# Patient Record
Sex: FEMALE | Race: WHITE | NOT HISPANIC OR LATINO | Employment: FULL TIME | URBAN - METROPOLITAN AREA
[De-identification: names, ages, dates, MRNs, and addresses within clinical notes are randomized per-mention and may not be internally consistent; named-entity substitution may affect disease eponyms.]

---

## 2020-06-18 ENCOUNTER — TELEPHONE (OUTPATIENT)
Dept: OBGYN CLINIC | Facility: CLINIC | Age: 55
End: 2020-06-18

## 2020-06-18 DIAGNOSIS — B37.3 CANDIDA VAGINITIS: Primary | ICD-10-CM

## 2020-06-18 RX ORDER — FLUCONAZOLE 150 MG/1
150 TABLET ORAL ONCE
COMMUNITY
End: 2021-08-24

## 2020-06-18 RX ORDER — FLUCONAZOLE 150 MG/1
150 TABLET ORAL
Qty: 2 TABLET | Refills: 0 | Status: SHIPPED | OUTPATIENT
Start: 2020-06-18 | End: 2020-06-22

## 2021-08-24 ENCOUNTER — HOSPITAL ENCOUNTER (INPATIENT)
Facility: HOSPITAL | Age: 56
LOS: 1 days | Discharge: HOME | DRG: 178 | End: 2021-08-25
Attending: EMERGENCY MEDICINE | Admitting: INTERNAL MEDICINE
Payer: COMMERCIAL

## 2021-08-24 ENCOUNTER — APPOINTMENT (EMERGENCY)
Dept: RADIOLOGY | Facility: HOSPITAL | Age: 56
DRG: 178 | End: 2021-08-24
Payer: COMMERCIAL

## 2021-08-24 DIAGNOSIS — E86.9 VOLUME DEPLETION: ICD-10-CM

## 2021-08-24 DIAGNOSIS — R07.9 CHEST PAIN: ICD-10-CM

## 2021-08-24 DIAGNOSIS — R42 DIZZINESS: Primary | ICD-10-CM

## 2021-08-24 DIAGNOSIS — R77.8 ELEVATED TROPONIN: ICD-10-CM

## 2021-08-24 PROBLEM — E78.5 DYSLIPIDEMIA: Status: ACTIVE | Noted: 2021-08-24

## 2021-08-24 PROBLEM — E11.9 TYPE 2 DIABETES MELLITUS (HCC): Status: ACTIVE | Noted: 2021-08-24

## 2021-08-24 PROBLEM — R55 NEAR SYNCOPE: Status: ACTIVE | Noted: 2021-08-24

## 2021-08-24 PROBLEM — Z98.890 H/O CAROTID ENDARTERECTOMY: Status: ACTIVE | Noted: 2021-08-24

## 2021-08-24 PROBLEM — R01.1 UNDIAGNOSED CARDIAC MURMURS: Status: ACTIVE | Noted: 2021-08-24

## 2021-08-24 LAB
ALBUMIN SERPL BCP-MCNC: 3.7 G/DL (ref 3.5–5)
ALP SERPL-CCNC: 92 U/L (ref 46–116)
ALT SERPL W P-5'-P-CCNC: 69 U/L (ref 12–78)
ANION GAP SERPL CALCULATED.3IONS-SCNC: 12 MMOL/L (ref 4–13)
APTT PPP: 29 SECONDS (ref 23–37)
APTT PPP: 60 SECONDS (ref 23–37)
AST SERPL W P-5'-P-CCNC: 37 U/L (ref 5–45)
ATRIAL RATE: 95 BPM
BASOPHILS # BLD AUTO: 0.02 THOUSANDS/ΜL (ref 0–0.1)
BASOPHILS NFR BLD AUTO: 0 % (ref 0–1)
BILIRUB SERPL-MCNC: 0.36 MG/DL (ref 0.2–1)
BUN SERPL-MCNC: 13 MG/DL (ref 5–25)
CALCIUM SERPL-MCNC: 8.9 MG/DL (ref 8.3–10.1)
CHLORIDE SERPL-SCNC: 103 MMOL/L (ref 100–108)
CO2 SERPL-SCNC: 24 MMOL/L (ref 21–32)
CREAT SERPL-MCNC: 1.05 MG/DL (ref 0.6–1.3)
D DIMER PPP FEU-MCNC: 0.65 UG/ML FEU
EOSINOPHIL # BLD AUTO: 0.02 THOUSAND/ΜL (ref 0–0.61)
EOSINOPHIL NFR BLD AUTO: 0 % (ref 0–6)
ERYTHROCYTE [DISTWIDTH] IN BLOOD BY AUTOMATED COUNT: 13.1 % (ref 11.6–15.1)
ERYTHROCYTE [DISTWIDTH] IN BLOOD BY AUTOMATED COUNT: 13.1 % (ref 11.6–15.1)
GFR SERPL CREATININE-BSD FRML MDRD: 60 ML/MIN/1.73SQ M
GLUCOSE SERPL-MCNC: 216 MG/DL (ref 65–140)
GLUCOSE SERPL-MCNC: 223 MG/DL (ref 65–140)
GLUCOSE SERPL-MCNC: 240 MG/DL (ref 65–140)
HCT VFR BLD AUTO: 38.3 % (ref 34.8–46.1)
HCT VFR BLD AUTO: 40.9 % (ref 34.8–46.1)
HGB BLD-MCNC: 13.1 G/DL (ref 11.5–15.4)
HGB BLD-MCNC: 13.7 G/DL (ref 11.5–15.4)
IMM GRANULOCYTES # BLD AUTO: 0.03 THOUSAND/UL (ref 0–0.2)
IMM GRANULOCYTES NFR BLD AUTO: 1 % (ref 0–2)
INR PPP: 0.96 (ref 0.84–1.19)
LYMPHOCYTES # BLD AUTO: 0.98 THOUSANDS/ΜL (ref 0.6–4.47)
LYMPHOCYTES NFR BLD AUTO: 18 % (ref 14–44)
MCH RBC QN AUTO: 31.6 PG (ref 26.8–34.3)
MCH RBC QN AUTO: 31.7 PG (ref 26.8–34.3)
MCHC RBC AUTO-ENTMCNC: 33.5 G/DL (ref 31.4–37.4)
MCHC RBC AUTO-ENTMCNC: 34.2 G/DL (ref 31.4–37.4)
MCV RBC AUTO: 93 FL (ref 82–98)
MCV RBC AUTO: 94 FL (ref 82–98)
MONOCYTES # BLD AUTO: 0.55 THOUSAND/ΜL (ref 0.17–1.22)
MONOCYTES NFR BLD AUTO: 10 % (ref 4–12)
NEUTROPHILS # BLD AUTO: 3.74 THOUSANDS/ΜL (ref 1.85–7.62)
NEUTS SEG NFR BLD AUTO: 71 % (ref 43–75)
NRBC BLD AUTO-RTO: 0 /100 WBCS
P AXIS: 62 DEGREES
PLATELET # BLD AUTO: 207 THOUSANDS/UL (ref 149–390)
PLATELET # BLD AUTO: 239 THOUSANDS/UL (ref 149–390)
PMV BLD AUTO: 10 FL (ref 8.9–12.7)
PMV BLD AUTO: 9.7 FL (ref 8.9–12.7)
POTASSIUM SERPL-SCNC: 4 MMOL/L (ref 3.5–5.3)
PR INTERVAL: 146 MS
PROT SERPL-MCNC: 7 G/DL (ref 6.4–8.2)
PROTHROMBIN TIME: 12.9 SECONDS (ref 11.6–14.5)
QRS AXIS: 13 DEGREES
QRSD INTERVAL: 88 MS
QT INTERVAL: 350 MS
QTC INTERVAL: 439 MS
RBC # BLD AUTO: 4.13 MILLION/UL (ref 3.81–5.12)
RBC # BLD AUTO: 4.34 MILLION/UL (ref 3.81–5.12)
SODIUM SERPL-SCNC: 139 MMOL/L (ref 136–145)
T WAVE AXIS: 50 DEGREES
TROPONIN I SERPL-MCNC: 0.21 NG/ML
TROPONIN I SERPL-MCNC: 0.59 NG/ML
TROPONIN I SERPL-MCNC: 0.7 NG/ML
VENTRICULAR RATE: 95 BPM
WBC # BLD AUTO: 4.06 THOUSAND/UL (ref 4.31–10.16)
WBC # BLD AUTO: 5.34 THOUSAND/UL (ref 4.31–10.16)

## 2021-08-24 PROCEDURE — 99285 EMERGENCY DEPT VISIT HI MDM: CPT | Performed by: EMERGENCY MEDICINE

## 2021-08-24 PROCEDURE — 71046 X-RAY EXAM CHEST 2 VIEWS: CPT

## 2021-08-24 PROCEDURE — 85610 PROTHROMBIN TIME: CPT | Performed by: INTERNAL MEDICINE

## 2021-08-24 PROCEDURE — 85027 COMPLETE CBC AUTOMATED: CPT | Performed by: INTERNAL MEDICINE

## 2021-08-24 PROCEDURE — 96365 THER/PROPH/DIAG IV INF INIT: CPT

## 2021-08-24 PROCEDURE — 85730 THROMBOPLASTIN TIME PARTIAL: CPT | Performed by: INTERNAL MEDICINE

## 2021-08-24 PROCEDURE — 82948 REAGENT STRIP/BLOOD GLUCOSE: CPT

## 2021-08-24 PROCEDURE — 99254 IP/OBS CNSLTJ NEW/EST MOD 60: CPT | Performed by: INTERNAL MEDICINE

## 2021-08-24 PROCEDURE — 93010 ELECTROCARDIOGRAM REPORT: CPT | Performed by: INTERNAL MEDICINE

## 2021-08-24 PROCEDURE — 99285 EMERGENCY DEPT VISIT HI MDM: CPT

## 2021-08-24 PROCEDURE — 99223 1ST HOSP IP/OBS HIGH 75: CPT | Performed by: INTERNAL MEDICINE

## 2021-08-24 PROCEDURE — 80053 COMPREHEN METABOLIC PANEL: CPT | Performed by: EMERGENCY MEDICINE

## 2021-08-24 PROCEDURE — 93005 ELECTROCARDIOGRAM TRACING: CPT

## 2021-08-24 PROCEDURE — 85379 FIBRIN DEGRADATION QUANT: CPT | Performed by: EMERGENCY MEDICINE

## 2021-08-24 PROCEDURE — 85025 COMPLETE CBC W/AUTO DIFF WBC: CPT | Performed by: EMERGENCY MEDICINE

## 2021-08-24 PROCEDURE — 84484 ASSAY OF TROPONIN QUANT: CPT | Performed by: EMERGENCY MEDICINE

## 2021-08-24 PROCEDURE — 84484 ASSAY OF TROPONIN QUANT: CPT | Performed by: INTERNAL MEDICINE

## 2021-08-24 PROCEDURE — 36415 COLL VENOUS BLD VENIPUNCTURE: CPT | Performed by: EMERGENCY MEDICINE

## 2021-08-24 RX ORDER — ASPIRIN 325 MG
325 TABLET ORAL ONCE
Status: COMPLETED | OUTPATIENT
Start: 2021-08-24 | End: 2021-08-24

## 2021-08-24 RX ORDER — FLUTICASONE PROPIONATE 50 MCG
2 SPRAY, SUSPENSION (ML) NASAL DAILY
COMMUNITY

## 2021-08-24 RX ORDER — LORATADINE 10 MG/1
10 TABLET ORAL AS NEEDED
COMMUNITY

## 2021-08-24 RX ORDER — METHYLPREDNISOLONE 16 MG/1
32 TABLET ORAL ONCE
Status: COMPLETED | OUTPATIENT
Start: 2021-08-25 | End: 2021-08-25

## 2021-08-24 RX ORDER — FERROUS SULFATE 325(65) MG
325 TABLET ORAL
COMMUNITY

## 2021-08-24 RX ORDER — EZETIMIBE 10 MG/1
10 TABLET ORAL
Status: DISCONTINUED | OUTPATIENT
Start: 2021-08-24 | End: 2021-08-25 | Stop reason: HOSPADM

## 2021-08-24 RX ORDER — SEMAGLUTIDE 1.34 MG/ML
1 INJECTION, SOLUTION SUBCUTANEOUS WEEKLY
COMMUNITY

## 2021-08-24 RX ORDER — BIOTIN 10 MG
10 TABLET ORAL
COMMUNITY

## 2021-08-24 RX ORDER — HEPARIN SODIUM 1000 [USP'U]/ML
4000 INJECTION, SOLUTION INTRAVENOUS; SUBCUTANEOUS
Status: DISCONTINUED | OUTPATIENT
Start: 2021-08-24 | End: 2021-08-25 | Stop reason: HOSPADM

## 2021-08-24 RX ORDER — FLUTICASONE PROPIONATE 50 MCG
2 SPRAY, SUSPENSION (ML) NASAL DAILY
Status: DISCONTINUED | OUTPATIENT
Start: 2021-08-24 | End: 2021-08-25 | Stop reason: HOSPADM

## 2021-08-24 RX ORDER — OMEPRAZOLE 20 MG/1
20 CAPSULE, DELAYED RELEASE ORAL DAILY
COMMUNITY

## 2021-08-24 RX ORDER — METHYLPREDNISOLONE 16 MG/1
32 TABLET ORAL ONCE
Status: DISCONTINUED | OUTPATIENT
Start: 2021-08-24 | End: 2021-08-24

## 2021-08-24 RX ORDER — LORATADINE 10 MG/1
10 TABLET ORAL DAILY
Status: DISCONTINUED | OUTPATIENT
Start: 2021-08-24 | End: 2021-08-25 | Stop reason: HOSPADM

## 2021-08-24 RX ORDER — PANTOPRAZOLE SODIUM 40 MG/1
40 TABLET, DELAYED RELEASE ORAL
Status: DISCONTINUED | OUTPATIENT
Start: 2021-08-25 | End: 2021-08-25 | Stop reason: HOSPADM

## 2021-08-24 RX ORDER — DIPHENHYDRAMINE HCL 25 MG
50 TABLET ORAL ONCE AS NEEDED
Status: COMPLETED | OUTPATIENT
Start: 2021-08-24 | End: 2021-08-25

## 2021-08-24 RX ORDER — EZETIMIBE 10 MG/1
10 TABLET ORAL
COMMUNITY

## 2021-08-24 RX ORDER — HEPARIN SODIUM 10000 [USP'U]/100ML
3-20 INJECTION, SOLUTION INTRAVENOUS
Status: DISCONTINUED | OUTPATIENT
Start: 2021-08-24 | End: 2021-08-25 | Stop reason: HOSPADM

## 2021-08-24 RX ORDER — METHYLPREDNISOLONE 16 MG/1
32 TABLET ORAL ONCE
Status: COMPLETED | OUTPATIENT
Start: 2021-08-24 | End: 2021-08-24

## 2021-08-24 RX ORDER — HEPARIN SODIUM 1000 [USP'U]/ML
2000 INJECTION, SOLUTION INTRAVENOUS; SUBCUTANEOUS
Status: DISCONTINUED | OUTPATIENT
Start: 2021-08-24 | End: 2021-08-25 | Stop reason: HOSPADM

## 2021-08-24 RX ORDER — ACETAMINOPHEN 325 MG/1
650 TABLET ORAL EVERY 6 HOURS PRN
Status: DISCONTINUED | OUTPATIENT
Start: 2021-08-24 | End: 2021-08-25 | Stop reason: HOSPADM

## 2021-08-24 RX ORDER — ONDANSETRON 2 MG/ML
4 INJECTION INTRAMUSCULAR; INTRAVENOUS EVERY 6 HOURS PRN
Status: DISCONTINUED | OUTPATIENT
Start: 2021-08-24 | End: 2021-08-25 | Stop reason: HOSPADM

## 2021-08-24 RX ORDER — POTASSIUM CITRATE 10 MEQ/1
20 TABLET, EXTENDED RELEASE ORAL
COMMUNITY
Start: 2021-04-19

## 2021-08-24 RX ORDER — POTASSIUM CITRATE 10 MEQ/1
20 TABLET, EXTENDED RELEASE ORAL 2 TIMES DAILY
Status: DISCONTINUED | OUTPATIENT
Start: 2021-08-24 | End: 2021-08-25 | Stop reason: HOSPADM

## 2021-08-24 RX ORDER — ASPIRIN 81 MG/1
324 TABLET, CHEWABLE ORAL DAILY
Status: DISCONTINUED | OUTPATIENT
Start: 2021-08-25 | End: 2021-08-25 | Stop reason: HOSPADM

## 2021-08-24 RX ORDER — FERROUS SULFATE 325(65) MG
325 TABLET ORAL
Status: DISCONTINUED | OUTPATIENT
Start: 2021-08-25 | End: 2021-08-25 | Stop reason: HOSPADM

## 2021-08-24 RX ORDER — SODIUM CHLORIDE 9 MG/ML
75 INJECTION, SOLUTION INTRAVENOUS ONCE
Status: COMPLETED | OUTPATIENT
Start: 2021-08-24 | End: 2021-08-25

## 2021-08-24 RX ORDER — HEPARIN SODIUM 1000 [USP'U]/ML
4000 INJECTION, SOLUTION INTRAVENOUS; SUBCUTANEOUS ONCE
Status: COMPLETED | OUTPATIENT
Start: 2021-08-24 | End: 2021-08-24

## 2021-08-24 RX ORDER — DIPHENHYDRAMINE HCL 25 MG
50 TABLET ORAL ONCE
Status: COMPLETED | OUTPATIENT
Start: 2021-08-24 | End: 2021-08-24

## 2021-08-24 RX ADMIN — SODIUM CHLORIDE 75 ML/HR: 0.9 INJECTION, SOLUTION INTRAVENOUS at 14:15

## 2021-08-24 RX ADMIN — METHYLPREDNISOLONE 32 MG: 16 TABLET ORAL at 14:34

## 2021-08-24 RX ADMIN — HEPARIN SODIUM 11.1 UNITS/KG/HR: 10000 INJECTION, SOLUTION INTRAVENOUS at 15:08

## 2021-08-24 RX ADMIN — EZETIMIBE 10 MG: 10 TABLET ORAL at 21:16

## 2021-08-24 RX ADMIN — FLUTICASONE PROPIONATE 2 SPRAY: 50 SPRAY, METERED NASAL at 15:11

## 2021-08-24 RX ADMIN — DIPHENHYDRAMINE HCL 50 MG: 25 TABLET, COATED ORAL at 14:11

## 2021-08-24 RX ADMIN — ASPIRIN 325 MG ORAL TABLET 325 MG: 325 PILL ORAL at 11:14

## 2021-08-24 RX ADMIN — MAGNESIUM OXIDE 400 MG: 400 TABLET ORAL at 15:58

## 2021-08-24 RX ADMIN — HEPARIN SODIUM 4000 UNITS: 1000 INJECTION INTRAVENOUS; SUBCUTANEOUS at 15:07

## 2021-08-24 RX ADMIN — SODIUM CHLORIDE, SODIUM LACTATE, POTASSIUM CHLORIDE, AND CALCIUM CHLORIDE 1000 ML: .6; .31; .03; .02 INJECTION, SOLUTION INTRAVENOUS at 10:07

## 2021-08-24 RX ADMIN — INSULIN LISPRO 3 UNITS: 100 INJECTION, SOLUTION INTRAVENOUS; SUBCUTANEOUS at 18:18

## 2021-08-24 RX ADMIN — LORATADINE 10 MG: 10 TABLET ORAL at 15:10

## 2021-08-24 RX ADMIN — POTASSIUM CITRATE 20 MEQ: 10 TABLET, EXTENDED RELEASE ORAL at 18:17

## 2021-08-24 NOTE — ASSESSMENT & PLAN NOTE
· Intermittent chest pain,  palpitation and near syncopal episode x 4  Initial troponin of 0 21  · Initial EKG unremarkable  · Monitor on Tele for arrythmia  Serial enzymes  · SALINA score 3  Will appreciate cardiology consultation  · If DDimer elevated will consider PE study as pt has been driving long trips   Will need prep prior (contrast allergy with hives)

## 2021-08-24 NOTE — ED NOTES
PT ordering her own lunch due to her diet restriction  A pt phone was given and menu with Tel # to call for dining services  DR Namita Lyon at bedside from Internal Medicine        [facility]  08/24/21 6008

## 2021-08-24 NOTE — H&P
[facility]  H&P- Alejandro Marx 1965, 64 y o  female MRN: [id number]  Unit/Bed#: ED 12 Encounter: [id number]  Primary Care Provider: No primary care provider on file  Date and time admitted to hospital: 8/24/2021  9:06 AM    * Chest pain  Assessment & Plan  · Intermittent chest pain,  palpitation and near syncopal episode x 4  Initial troponin of 0 21  · Initial EKG unremarkable  · Monitor on Tele for arrythmia  Serial enzymes  · SALINA score 3  Will appreciate cardiology consultation  · If DDimer elevated will consider PE study as pt has been driving long trips  Will need prep prior (contrast allergy with hives)    Near syncope  Assessment & Plan  Near syncopal episode times for this morning associated with palpitation  Telemetry monitoring  Will obtain orthostatic blood pressure  Current 's-110/60    Undiagnosed cardiac murmurs  Assessment & Plan  No Echo on record  Will obtain echocardiogram    Type 2 diabetes mellitus ([facility] [address] )  Assessment & Plan  No results found for: HGBA1C    Recent Labs     08/24/21  0913   POCGLU 216*       Blood Sugar Average: Last 72 hrs:  (P) 216   A1c last year 6 9  Will hold off on metformin  Repeat A1c  Sliding scale and Accu-Chek for now  Dyslipidemia  Assessment & Plan  Repeat lipid panel  Continue Zetia  Reported Statin allergy  H/O carotid endarterectomy  Assessment & Plan  Left CEA many years ago  She had TIA with transient speech difficulty at the time  VTE Prophylaxis: Enoxaparin (Lovenox)  / sequential compression device   Code Status:   POLST: POLST form is not discussed and not completed at this time  Discussion with family:     Anticipated Length of Stay:  Patient will be admitted on an Inpatient basis with an anticipated length of stay of  >2 midnights  Justification for Hospital Stay:Above    Total Time for Visit, including Counseling / Coordination of Care: 30 minutes    Greater than 50% of this total time spent on direct patient counseling and coordination of care  Chief Complaint:   Dizziness/chest pressure    History of Present Illness:    Nemesio Cuevas is a 64 y o  female with PMHX of TIA, carotid stenosis status post L CEA, DMII, dyslipidemia and heart murmur who presents with chest pressure dizziness and dry cough  She was in her usual state of health until last night when she started experiencing dry nonproductive cough  She went to an urgent care and was released for what felt to be allergy  She then started having intermittent chest pressure in the middle of her chest   She felt lightheaded and nearly passed out when she was in the shower this morning  She had another three more similar episodes of near syncopal events, worse from sitting to standing position, associated with palpations that she described as "racing heart beats"  Denies family history of premature CAD  She smoked cigarettes remotely 5 pack year  She does drive long 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  In the ED, her initial troponin is mildly elevated 0 21  Initial EKG unremarkable  /60  Denies current chest pressure  Review of Systems:    Review of Systems   Constitutional: Negative for appetite change, chills, diaphoresis, fatigue and fever  Respiratory: Positive for cough and chest tightness  Negative for wheezing  Cardiovascular: Positive for palpitations  Negative for leg swelling  Gastrointestinal: Negative for abdominal pain, nausea and vomiting  Neurological: Positive for dizziness and light-headedness  Negative for syncope  All other systems reviewed and are negative  Past Medical and Surgical History:     Past Medical History:   Diagnosis Date    Carotid artery calcification, left     CEA 2013    Diabetes mellitus ([facility] [address] )     Heart murmur        History reviewed  No pertinent surgical history      Meds/Allergies:    Prior to Admission medications    Medication Sig Start Date End Date Taking? Authorizing Provider   Biotin 10 MG TABS Take 10 mg by mouth   Yes Historical Provider, MD   calcium citrate-Vitamin D 200 mg-250 units Take by mouth   Yes Historical Provider, MD   cyanocobalamin (VITAMIN B-12) 1000 MCG tablet Take by mouth   Yes Historical Provider, MD   Cyanocobalamin ER 1500 MCG TBCR Take by mouth   Yes Historical Provider, MD   ezetimibe (ZETIA) 10 mg tablet Take 10 mg by mouth   Yes Historical Provider, MD   ferrous sulfate 325 (65 Fe) mg tablet Take 325 mg by mouth   Yes Historical Provider, MD   fluticasone (FLONASE) 50 mcg/act nasal spray 2 sprays into each nostril daily   Yes Historical Provider, MD   loratadine (CLARITIN) 10 mg tablet Take 10 mg by mouth as needed   Yes Historical Provider, MD   MAGNESIUM PO Take 400 mg by mouth   Yes Historical Provider, MD   metFORMIN (GLUCOPHAGE) 850 mg tablet Take 1,000 mg by mouth 2 (two) times a day   Yes Historical Provider, MD   Multiple Vitamin (MULTIVITAMIN ADULT PO) Take 1 tablet by mouth   Yes Historical Provider, MD   omeprazole (PriLOSEC) 20 mg delayed release capsule Take 20 mg by mouth daily   Yes Historical Provider, MD   potassium citrate (UROCIT-K) 10 mEq Take 20 mEq by mouth 4/19/21  Yes Historical Provider, MD   Probiotic Product (Misc Intestinal Zonia Regulat) CAPS Take by mouth   Yes Historical Provider, MD   semaglutide, 1 mg/dose, (Ozempic, 1 MG/DOSE,) 4 MG/3ML SOPN injection pen Inject 1 mg under the skin once a week   Yes Historical Provider, MD   Zinc 50 MG CAPS Take 50 mg by mouth   Yes Historical Provider, MD   fluconazole (DIFLUCAN) 150 mg tablet Take 150 mg by mouth once  8/24/21 Yes Historical Provider, MD         Allergies:    Allergies   Allergen Reactions    Dye [Iodinated Diagnostic Agents]     Statins        Social History:     Marital Status:    Occupation:   Patient Pre-hospital Living Situation: Home  Substance Use History:   Social History     Substance and Sexual Activity   Alcohol Use Never Social History     Tobacco Use   Smoking Status Former Smoker   Smokeless Tobacco Never Used     Social History     Substance and Sexual Activity   Drug Use Never       Family History: Mother had Afib  Denies family history of premature CAD    Physical Exam:     Vitals:   Blood Pressure: 107/57 (08/24/21 1100)  Pulse: 92 (08/24/21 1100)  Temperature: 99 4 °F (37 4 °C) (08/24/21 0856)  Temp Source: Oral (08/24/21 0856)  Respirations: 18 (08/24/21 1100)  Weight - Scale: 107 kg (235 lb) (08/24/21 0856)  SpO2: 95 % (08/24/21 1100)    Physical Exam  Constitutional:       General: She is not in acute distress  Appearance: She is not ill-appearing, toxic-appearing or diaphoretic  Eyes:      General:         Right eye: No discharge  Left eye: No discharge  Cardiovascular:      Rate and Rhythm: Normal rate and regular rhythm  Heart sounds: Murmur heard  Pulmonary:      Effort: Pulmonary effort is normal  No respiratory distress  Breath sounds: Normal breath sounds  No wheezing or rales  Abdominal:      General: Abdomen is flat  Bowel sounds are normal  There is no distension  Palpations: Abdomen is soft  Tenderness: There is no abdominal tenderness  Musculoskeletal:         General: No swelling, tenderness or deformity  Skin:     General: Skin is warm and dry  Neurological:      Mental Status: She is oriented to person, place, and time  Psychiatric:         Mood and Affect: Mood normal          Behavior: Behavior normal          Thought Content:  Thought content normal            Additional Data:      Lab Results:     Results from last 7 days   Lab Units 08/24/21  1004   WBC Thousand/uL 5 34   HEMOGLOBIN g/dL 13 7   HEMATOCRIT % 40 9   PLATELETS Thousands/uL 207   NEUTROS PCT % 71   LYMPHS PCT % 18   MONOS PCT % 10   EOS PCT % 0     Results from last 7 days   Lab Units 08/24/21  1004   SODIUM mmol/L 139   POTASSIUM mmol/L 4 0   CHLORIDE mmol/L 103   CO2 mmol/L 24   BUN mg/dL 13   CREATININE mg/dL 1 05   ANION GAP mmol/L 12   CALCIUM mg/dL 8 9   ALBUMIN g/dL 3 7   TOTAL BILIRUBIN mg/dL 0 36   ALK PHOS U/L 92   ALT U/L 69   AST U/L 37   GLUCOSE RANDOM mg/dL 223*         Results from last 7 days   Lab Units 08/24/21  0913   POC GLUCOSE mg/dl 216*               Imaging:     XR chest 2 views    (Results Pending)       EKG, Pathology, and Other Studies Reviewed on Admission:   · EKG: NSR 95/min no ST-T changes    Allscripts / Epic Records Reviewed: Yes     ** Please Note: This note has been constructed using a voice recognition system   **

## 2021-08-24 NOTE — ED PROVIDER NOTES
History  Chief Complaint   Patient presents with    Syncope     near syncopal episode x4 this am     70-year-old female presents today for evaluation of head dizziness/lightheadedness that started this morning  She states yesterday she developed a cough, was seen at urgent care and diagnosed with seasonal allergies  She states this morning she was in the shower and felt lightheaded, like her vision was closing in, like she might pass out  She was able to go and sit with resolution of her symptoms  She had 3 more episodes of similar symptoms all with change in position, from sitting to standing  Also admits to mild chest pressure  Does not radiate  She has not actually lost consciousness  No shortness of breath  No diaphoresis  No nausea  No fever  History provided by:  Patient  Dizziness  Quality:  Lightheadedness  Severity:  Mild  Onset quality:  Sudden  Duration:  1 day  Timing:  Intermittent  Chronicity:  New  Context: standing up    Relieved by:  Change in position  Worsened by:  Standing up  Ineffective treatments:  None tried  Associated symptoms: chest pain (mild, heaviness)    Associated symptoms: no headaches, no hearing loss, no palpitations, no shortness of breath, no vision changes and no vomiting    Risk factors: no heart disease, no hx of vertigo, no multiple medications and no new medications        Prior to Admission Medications   Prescriptions Last Dose Informant Patient Reported? Taking?    Biotin 10 MG TABS 8/23/2021 at Unknown time  Yes Yes   Sig: Take 10 mg by mouth   Cyanocobalamin ER 1500 MCG TBCR 8/23/2021 at Unknown time  Yes Yes   Sig: Take by mouth   MAGNESIUM PO 8/23/2021 at Unknown time  Yes Yes   Sig: Take 400 mg by mouth   Multiple Vitamin (MULTIVITAMIN ADULT PO) 8/23/2021 at Unknown time  Yes Yes   Sig: Take 1 tablet by mouth   Probiotic Product (Misc Intestinal Zonia Regulat) CAPS 8/23/2021 at Unknown time  Yes Yes   Sig: Take by mouth   Zinc 50 MG CAPS 8/23/2021 at Unknown time  Yes Yes   Sig: Take 50 mg by mouth   calcium citrate-Vitamin D 200 mg-250 units 2021 at Unknown time  Yes Yes   Sig: Take by mouth   cyanocobalamin (VITAMIN B-12) 1000 MCG tablet 2021 at Unknown time  Yes Yes   Sig: Take by mouth   ezetimibe (ZETIA) 10 mg tablet 2021 at Unknown time  Yes Yes   Sig: Take 10 mg by mouth   ferrous sulfate 325 (65 Fe) mg tablet 2021 at Unknown time  Yes Yes   Sig: Take 325 mg by mouth   fluticasone (FLONASE) 50 mcg/act nasal spray 2021 at Unknown time  Yes Yes   Si sprays into each nostril daily   loratadine (CLARITIN) 10 mg tablet 2021 at Unknown time  Yes Yes   Sig: Take 10 mg by mouth as needed   metFORMIN (GLUCOPHAGE) 850 mg tablet 2021 at Unknown time  Yes Yes   Sig: Take 1,000 mg by mouth 2 (two) times a day   omeprazole (PriLOSEC) 20 mg delayed release capsule 2021 at Unknown time  Yes Yes   Sig: Take 20 mg by mouth daily   potassium citrate (UROCIT-K) 10 mEq 2021 at Unknown time  Yes Yes   Sig: Take 20 mEq by mouth   semaglutide, 1 mg/dose, (Ozempic, 1 MG/DOSE,) 4 MG/3ML SOPN injection pen Past Week at Unknown time  Yes Yes   Sig: Inject 1 mg under the skin once a week      Facility-Administered Medications: None       Past Medical History:   Diagnosis Date    Carotid artery calcification, left     CEA     Diabetes mellitus ([facility] [address] )     Heart murmur        History reviewed  No pertinent surgical history  History reviewed  No pertinent family history  I have reviewed and agree with the history as documented  E-Cigarette/Vaping     E-Cigarette/Vaping Substances     Social History     Tobacco Use    Smoking status: Former Smoker    Smokeless tobacco: Never Used   Substance Use Topics    Alcohol use: Never    Drug use: Never       Review of Systems   Constitutional: Negative for chills and fatigue  HENT: Negative for hearing loss, postnasal drip, sore throat and trouble swallowing      Eyes: Negative for visual disturbance  Respiratory: Positive for cough  Negative for chest tightness and shortness of breath  Cardiovascular: Positive for chest pain (mild, heaviness)  Negative for palpitations and leg swelling  Gastrointestinal: Negative for abdominal pain and vomiting  Genitourinary: Negative for dysuria  Musculoskeletal: Negative for back pain  Skin: Negative for rash  Allergic/Immunologic: Negative for immunocompromised state  Neurological: Positive for dizziness  Negative for light-headedness and headaches  Physical Exam  Physical Exam  Vitals and nursing note reviewed  Constitutional:       Appearance: She is well-developed  HENT:      Head: Normocephalic and atraumatic  Mouth/Throat:      Mouth: Mucous membranes are moist       Pharynx: Uvula midline  Tonsils: No tonsillar exudate  Eyes:      Pupils: Pupils are equal, round, and reactive to light  Cardiovascular:      Rate and Rhythm: Regular rhythm  Tachycardia present  Heart sounds: Murmur (Holosystolic, 2/4, best heard at the left sternal border at the 2nd intercostal space ) heard  Comments: Patient is mildly tachycardic at 106 beats per minute    Pulmonary:      Effort: Pulmonary effort is normal       Breath sounds: Normal breath sounds  Abdominal:      General: Bowel sounds are normal       Palpations: Abdomen is soft  Tenderness: There is no abdominal tenderness  There is no guarding or rebound  Musculoskeletal:         General: No tenderness or deformity  Cervical back: Normal range of motion and neck supple  Right lower leg: No edema  Left lower leg: No edema  Skin:     General: Skin is warm and dry  Capillary Refill: Capillary refill takes less than 2 seconds  Neurological:      General: No focal deficit present  Mental Status: She is alert and oriented to person, place, and time        Comments: Patient moving all extremities equally, no focal neuro deficits noted        Psychiatric:         Mood and Affect: Mood normal          Behavior: Behavior normal          Vital Signs  ED Triage Vitals [08/24/21 0856]   Temperature Pulse Respirations Blood Pressure SpO2   99 4 °F (37 4 °C) (!) 106 16 111/68 99 %      Temp Source Heart Rate Source Patient Position - Orthostatic VS BP Location FiO2 (%)   Oral Monitor Sitting Left arm --      Pain Score       2           Vitals:    08/24/21 0856 08/24/21 1010 08/24/21 1100 08/24/21 1505   BP: 111/68 112/64 107/57 113/59   Pulse: (!) 106 96 92 84   Patient Position - Orthostatic VS: Sitting Sitting Lying Lying         Visual Acuity  Visual Acuity      Most Recent Value   L Pupil Size (mm)  3   R Pupil Size (mm)  3          ED Medications  Medications   potassium citrate (UROCIT-K) CR tablet 20 mEq (has no administration in time range)   ezetimibe (ZETIA) tablet 10 mg (has no administration in time range)   ferrous sulfate tablet 325 mg (has no administration in time range)   fluticasone (FLONASE) 50 mcg/act nasal spray 2 spray (has no administration in time range)   loratadine (CLARITIN) tablet 10 mg (has no administration in time range)   magnesium oxide (MAG-OX) tablet 400 mg (has no administration in time range)   pantoprazole (PROTONIX) EC tablet 40 mg (has no administration in time range)   acetaminophen (TYLENOL) tablet 650 mg (has no administration in time range)   ondansetron (ZOFRAN) injection 4 mg (has no administration in time range)   insulin lispro (HumaLOG) 100 units/mL subcutaneous injection 1-6 Units (has no administration in time range)   aspirin chewable tablet 324 mg (has no administration in time range)   methylPREDNISolone (MEDROL) tablet 32 mg (has no administration in time range)   heparin (porcine) injection 4,000 Units (has no administration in time range)   heparin (porcine) 25,000 units in 0 45% NaCl 250 mL infusion (premix) (has no administration in time range)   heparin (porcine) injection 4,000 Units (has no administration in time range)   heparin (porcine) injection 2,000 Units (has no administration in time range)   lactated ringers bolus 1,000 mL (0 mL Intravenous Stopped 8/24/21 1107)   aspirin tablet 325 mg (325 mg Oral Given 8/24/21 1114)   sodium chloride 0 9 % infusion (75 mL/hr Intravenous New Bag 8/24/21 1415)   methylPREDNISolone (MEDROL) tablet 32 mg (32 mg Oral Given 8/24/21 1434)   diphenhydrAMINE (BENADRYL) tablet 50 mg (50 mg Oral Given 8/24/21 1411)       Diagnostic Studies  Results Reviewed     Procedure Component Value Units Date/Time    APTT six (6) hours after Heparin bolus/drip initiation or dosing change [[id number]]  (Normal) Collected: 08/24/21 1410    Lab Status: Final result Specimen: Blood from Arm, Left Updated: 08/24/21 1452     PTT 29 seconds     Protime-INR [[id number]]  (Normal) Collected: 08/24/21 1410    Lab Status: Final result Specimen: Blood from Arm, Left Updated: 08/24/21 1452     Protime 12 9 seconds      INR 0 96    Troponin I [[id number]] Collected: 08/24/21 1435    Lab Status:  In process Specimen: Blood from Arm, Left Updated: 08/24/21 1446    CBC [[id number]]  (Abnormal) Collected: 08/24/21 1410    Lab Status: Final result Specimen: Blood from Arm, Left Updated: 08/24/21 1428     WBC 4 06 Thousand/uL      RBC 4 13 Million/uL      Hemoglobin 13 1 g/dL      Hematocrit 38 3 %      MCV 93 fL      MCH 31 7 pg      MCHC 34 2 g/dL      RDW 13 1 %      Platelets 838 Thousands/uL      MPV 10 0 fL     D-Dimer [[id number]]  (Abnormal) Collected: 08/24/21 1140    Lab Status: Final result Specimen: Blood from Arm, Left Updated: 08/24/21 1226     D-Dimer, Quant 0 65 ug/ml FEU     Troponin I [[id number]]  (Abnormal) Collected: 08/24/21 1004    Lab Status: Final result Specimen: Blood from Arm, Right Updated: 08/24/21 1046     Troponin I 0 21 ng/mL     Comprehensive metabolic panel [[id number]]  (Abnormal) Collected: 08/24/21 1004    Lab Status: Final result Specimen: Blood from Arm, Right Updated: 08/24/21 1045     Sodium 139 mmol/L      Potassium 4 0 mmol/L      Chloride 103 mmol/L      CO2 24 mmol/L      ANION GAP 12 mmol/L      BUN 13 mg/dL      Creatinine 1 05 mg/dL      Glucose 223 mg/dL      Calcium 8 9 mg/dL      AST 37 U/L      ALT 69 U/L      Alkaline Phosphatase 92 U/L      Total Protein 7 0 g/dL      Albumin 3 7 g/dL      Total Bilirubin 0 36 mg/dL      eGFR 60 ml/min/1 73sq m     Narrative:      National Kidney Disease Foundation guidelines for Chronic Kidney Disease (CKD):     Stage 1 with normal or high GFR (GFR > 90 mL/min/1 73 square meters)    Stage 2 Mild CKD (GFR = 60-89 mL/min/1 73 square meters)    Stage 3A Moderate CKD (GFR = 45-59 mL/min/1 73 square meters)    Stage 3B Moderate CKD (GFR = 30-44 mL/min/1 73 square meters)    Stage 4 Severe CKD (GFR = 15-29 mL/min/1 73 square meters)    Stage 5 End Stage CKD (GFR <15 mL/min/1 73 square meters)  Note: GFR calculation is accurate only with a steady state creatinine    CBC and differential [[id number]] Collected: 08/24/21 1004    Lab Status: Final result Specimen: Blood from Arm, Right Updated: 08/24/21 1014     WBC 5 34 Thousand/uL      RBC 4 34 Million/uL      Hemoglobin 13 7 g/dL      Hematocrit 40 9 %      MCV 94 fL      MCH 31 6 pg      MCHC 33 5 g/dL      RDW 13 1 %      MPV 9 7 fL      Platelets 047 Thousands/uL      nRBC 0 /100 WBCs      Neutrophils Relative 71 %      Immat GRANS % 1 %      Lymphocytes Relative 18 %      Monocytes Relative 10 %      Eosinophils Relative 0 %      Basophils Relative 0 %      Neutrophils Absolute 3 74 Thousands/µL      Immature Grans Absolute 0 03 Thousand/uL      Lymphocytes Absolute 0 98 Thousands/µL      Monocytes Absolute 0 55 Thousand/µL      Eosinophils Absolute 0 02 Thousand/µL      Basophils Absolute 0 02 Thousands/µL     Fingerstick Glucose (POCT) [[id number]]  (Abnormal) Collected: 08/24/21 0913    Lab Status: Final result Updated: 08/24/21 0924     POC Glucose 216 mg/dl XR chest 2 views   Final Result by Roland Silva MD (08/24 6530)      No acute cardiopulmonary disease  Workstation performed: [id number]         CTA chest pe study    (Results Pending)              Procedures  ECG 12 Lead Documentation Only    Date/Time: 8/24/2021 9:23 AM  Performed by: Champ Ash DO  Authorized by: Marisa Crespo DO     Indications / Diagnosis:  Dizziness  ECG reviewed by me, the ED Provider: yes    Patient location:  ED  Previous ECG:     Previous ECG:  Unavailable  Comments:      Sinus rhythm at 95 beats per minute  Normal axis, normal intervals, no ST T wave abnormalities suggestive of ischemia  QTC is normal   No old available for comparison  ED Course                               SALINA Risk Score      Most Recent Value   Age >= 65  0 Filed at: 08/24/2021 1130   Known CAD (stenosis >= 50%)  0 Filed at: 08/24/2021 1130   Recent (<=24 hrs) Service Angina  0 Filed at: 08/24/2021 1130   ST Deviation >= 0 5 mm  0 Filed at: 08/24/2021 1130   3+ CAD Risk Factors (FHx, HTN, HLP, DM, Smoker)  1 Filed at: 08/24/2021 1130   Aspirin Use Past 7 Days  1 Filed at: 08/24/2021 1130   Elevated Cardiac Markers  1 Filed at: 08/24/2021 1130   SALINA Risk Score (Calculated)  3 Filed at: 08/24/2021 1130                  MDM  Number of Diagnoses or Management Options  Dizziness: new and requires workup  Elevated troponin  Volume depletion: new and requires workup  Diagnosis management comments: 10:44 AM  Patient feeling better after initiation of IVF  HR down to 94  Waiting on labs  EKG unremarkable  CXR negative by my read  10:56 AM  Troponin elevated at 0 21  Will add on ddimer and discuss admission with SLIM           Amount and/or Complexity of Data Reviewed  Clinical lab tests: ordered and reviewed  Tests in the radiology section of CPT®: ordered and reviewed  Tests in the medicine section of CPT®: ordered and reviewed  Review and summarize past medical records: yes  Independent visualization of images, tracings, or specimens: yes    Risk of Complications, Morbidity, and/or Mortality  Presenting problems: high  Diagnostic procedures: high  Management options: high    Patient Progress  Patient progress: improved      Disposition  Final diagnoses:   Dizziness   Volume depletion   Elevated troponin     Time reflects when diagnosis was documented in both MDM as applicable and the Disposition within this note     Time User Action Codes Description Comment    8/24/2021 10:45 AM Fanta Heman Add [R42] Dizziness     8/24/2021 10:45 AM Fanta Heman Add [E86 9] Volume depletion     8/24/2021 11:57 AM Bernadettesunny Dee Add [R07 9] Chest pain     8/24/2021 12:02 PM Markos Crespo Add [R77 8] Elevated troponin       ED Disposition     ED Disposition Condition Date/Time Comment    Admit Stable Tue Aug 24, 2021 12:02 PM Case was discussed with OSORIO and the patient's admission status was agreed to be Admission Status: inpatient status to the service of Dr Tita Vargas   Follow-up Information    None         Patient's Medications   Discharge Prescriptions    No medications on file     No discharge procedures on file      PDMP Review     None          ED Provider  Electronically Signed by           Inge Tucker DO  08/24/21 4211

## 2021-08-24 NOTE — ASSESSMENT & PLAN NOTE
No results found for: HGBA1C    Recent Labs     08/24/21  0913   POCGLU 216*       Blood Sugar Average: Last 72 hrs:  (P) 216   A1c last year 6 9  Will hold off on metformin  Repeat A1c  Sliding scale and Accu-Chek for now

## 2021-08-24 NOTE — ASSESSMENT & PLAN NOTE
Near syncopal episode times for this morning associated with palpitation  Telemetry monitoring    Will obtain orthostatic blood pressure  Current 's-110/60

## 2021-08-25 ENCOUNTER — APPOINTMENT (INPATIENT)
Dept: CT IMAGING | Facility: HOSPITAL | Age: 56
DRG: 178 | End: 2021-08-25
Payer: COMMERCIAL

## 2021-08-25 ENCOUNTER — TELEMEDICINE (OUTPATIENT)
Dept: INTERNAL MEDICINE CLINIC | Age: 56
End: 2021-08-25
Payer: COMMERCIAL

## 2021-08-25 ENCOUNTER — APPOINTMENT (INPATIENT)
Dept: NUCLEAR MEDICINE | Facility: HOSPITAL | Age: 56
DRG: 178 | End: 2021-08-25
Payer: COMMERCIAL

## 2021-08-25 ENCOUNTER — APPOINTMENT (INPATIENT)
Dept: NON INVASIVE DIAGNOSTICS | Facility: HOSPITAL | Age: 56
DRG: 178 | End: 2021-08-25
Payer: COMMERCIAL

## 2021-08-25 VITALS
HEIGHT: 64 IN | HEART RATE: 70 BPM | WEIGHT: 235.89 LBS | DIASTOLIC BLOOD PRESSURE: 76 MMHG | OXYGEN SATURATION: 96 % | RESPIRATION RATE: 18 BRPM | SYSTOLIC BLOOD PRESSURE: 134 MMHG | TEMPERATURE: 97.7 F | BODY MASS INDEX: 40.27 KG/M2

## 2021-08-25 DIAGNOSIS — E66.01 CLASS 3 SEVERE OBESITY DUE TO EXCESS CALORIES WITH SERIOUS COMORBIDITY AND BODY MASS INDEX (BMI) OF 40.0 TO 44.9 IN ADULT (HCC): ICD-10-CM

## 2021-08-25 DIAGNOSIS — U07.1 COVID-19: ICD-10-CM

## 2021-08-25 DIAGNOSIS — M17.11 PRIMARY OSTEOARTHRITIS OF RIGHT KNEE: ICD-10-CM

## 2021-08-25 DIAGNOSIS — E11.3293 TYPE 2 DIABETES MELLITUS WITH BOTH EYES AFFECTED BY MILD NONPROLIFERATIVE RETINOPATHY WITHOUT MACULAR EDEMA, WITH LONG-TERM CURRENT USE OF INSULIN (HCC): Primary | ICD-10-CM

## 2021-08-25 DIAGNOSIS — I35.0 AORTIC VALVE STENOSIS, ETIOLOGY OF CARDIAC VALVE DISEASE UNSPECIFIED: ICD-10-CM

## 2021-08-25 DIAGNOSIS — N20.0 RIGHT KIDNEY STONE: ICD-10-CM

## 2021-08-25 DIAGNOSIS — Z79.4 TYPE 2 DIABETES MELLITUS WITH BOTH EYES AFFECTED BY MILD NONPROLIFERATIVE RETINOPATHY WITHOUT MACULAR EDEMA, WITH LONG-TERM CURRENT USE OF INSULIN (HCC): Primary | ICD-10-CM

## 2021-08-25 PROBLEM — Z96.652 S/P TOTAL KNEE ARTHROPLASTY, LEFT: Status: ACTIVE | Noted: 2018-09-27

## 2021-08-25 LAB
ANION GAP SERPL CALCULATED.3IONS-SCNC: 10 MMOL/L (ref 4–13)
APTT PPP: 51 SECONDS (ref 23–37)
APTT PPP: 96 SECONDS (ref 23–37)
BASOPHILS # BLD AUTO: 0.01 THOUSANDS/ΜL (ref 0–0.1)
BASOPHILS NFR BLD AUTO: 0 % (ref 0–1)
BUN SERPL-MCNC: 14 MG/DL (ref 5–25)
CALCIUM SERPL-MCNC: 8.6 MG/DL (ref 8.3–10.1)
CHLORIDE SERPL-SCNC: 103 MMOL/L (ref 100–108)
CO2 SERPL-SCNC: 24 MMOL/L (ref 21–32)
CREAT SERPL-MCNC: 0.92 MG/DL (ref 0.6–1.3)
EOSINOPHIL # BLD AUTO: 0 THOUSAND/ΜL (ref 0–0.61)
EOSINOPHIL NFR BLD AUTO: 0 % (ref 0–6)
ERYTHROCYTE [DISTWIDTH] IN BLOOD BY AUTOMATED COUNT: 13 % (ref 11.6–15.1)
EST. AVERAGE GLUCOSE BLD GHB EST-MCNC: 192 MG/DL
GFR SERPL CREATININE-BSD FRML MDRD: 70 ML/MIN/1.73SQ M
GLUCOSE SERPL-MCNC: 218 MG/DL (ref 65–140)
GLUCOSE SERPL-MCNC: 218 MG/DL (ref 65–140)
GLUCOSE SERPL-MCNC: 352 MG/DL (ref 65–140)
HBA1C MFR BLD: 8.3 %
HCT VFR BLD AUTO: 37.8 % (ref 34.8–46.1)
HGB BLD-MCNC: 12.6 G/DL (ref 11.5–15.4)
IMM GRANULOCYTES # BLD AUTO: 0.02 THOUSAND/UL (ref 0–0.2)
IMM GRANULOCYTES NFR BLD AUTO: 1 % (ref 0–2)
LYMPHOCYTES # BLD AUTO: 0.9 THOUSANDS/ΜL (ref 0.6–4.47)
LYMPHOCYTES NFR BLD AUTO: 22 % (ref 14–44)
MCH RBC QN AUTO: 31.2 PG (ref 26.8–34.3)
MCHC RBC AUTO-ENTMCNC: 33.3 G/DL (ref 31.4–37.4)
MCV RBC AUTO: 94 FL (ref 82–98)
MONOCYTES # BLD AUTO: 0.34 THOUSAND/ΜL (ref 0.17–1.22)
MONOCYTES NFR BLD AUTO: 8 % (ref 4–12)
NEUTROPHILS # BLD AUTO: 2.88 THOUSANDS/ΜL (ref 1.85–7.62)
NEUTS SEG NFR BLD AUTO: 69 % (ref 43–75)
NRBC BLD AUTO-RTO: 0 /100 WBCS
PLATELET # BLD AUTO: 209 THOUSANDS/UL (ref 149–390)
PMV BLD AUTO: 10 FL (ref 8.9–12.7)
POTASSIUM SERPL-SCNC: 4.1 MMOL/L (ref 3.5–5.3)
RBC # BLD AUTO: 4.04 MILLION/UL (ref 3.81–5.12)
SARS-COV-2 RNA RESP QL NAA+PROBE: POSITIVE
SODIUM SERPL-SCNC: 137 MMOL/L (ref 136–145)
TROPONIN I SERPL-MCNC: 0.35 NG/ML
TSH SERPL DL<=0.05 MIU/L-ACNC: 0.51 UIU/ML (ref 0.36–3.74)
WBC # BLD AUTO: 4.15 THOUSAND/UL (ref 4.31–10.16)

## 2021-08-25 PROCEDURE — 99214 OFFICE O/P EST MOD 30 MIN: CPT | Performed by: INTERNAL MEDICINE

## 2021-08-25 PROCEDURE — U0005 INFEC AGEN DETEC AMPLI PROBE: HCPCS | Performed by: PHYSICIAN ASSISTANT

## 2021-08-25 PROCEDURE — G1004 CDSM NDSC: HCPCS

## 2021-08-25 PROCEDURE — 83036 HEMOGLOBIN GLYCOSYLATED A1C: CPT | Performed by: INTERNAL MEDICINE

## 2021-08-25 PROCEDURE — 85025 COMPLETE CBC W/AUTO DIFF WBC: CPT | Performed by: INTERNAL MEDICINE

## 2021-08-25 PROCEDURE — 93017 CV STRESS TEST TRACING ONLY: CPT

## 2021-08-25 PROCEDURE — 84443 ASSAY THYROID STIM HORMONE: CPT | Performed by: INTERNAL MEDICINE

## 2021-08-25 PROCEDURE — 93306 TTE W/DOPPLER COMPLETE: CPT | Performed by: INTERNAL MEDICINE

## 2021-08-25 PROCEDURE — 93016 CV STRESS TEST SUPVJ ONLY: CPT | Performed by: INTERNAL MEDICINE

## 2021-08-25 PROCEDURE — U0003 INFECTIOUS AGENT DETECTION BY NUCLEIC ACID (DNA OR RNA); SEVERE ACUTE RESPIRATORY SYNDROME CORONAVIRUS 2 (SARS-COV-2) (CORONAVIRUS DISEASE [COVID-19]), AMPLIFIED PROBE TECHNIQUE, MAKING USE OF HIGH THROUGHPUT TECHNOLOGIES AS DESCRIBED BY CMS-2020-01-R: HCPCS | Performed by: PHYSICIAN ASSISTANT

## 2021-08-25 PROCEDURE — 78452 HT MUSCLE IMAGE SPECT MULT: CPT | Performed by: INTERNAL MEDICINE

## 2021-08-25 PROCEDURE — 3052F HG A1C>EQUAL 8.0%<EQUAL 9.0%: CPT | Performed by: INTERNAL MEDICINE

## 2021-08-25 PROCEDURE — 99232 SBSQ HOSP IP/OBS MODERATE 35: CPT | Performed by: INTERNAL MEDICINE

## 2021-08-25 PROCEDURE — 71275 CT ANGIOGRAPHY CHEST: CPT

## 2021-08-25 PROCEDURE — 93018 CV STRESS TEST I&R ONLY: CPT | Performed by: INTERNAL MEDICINE

## 2021-08-25 PROCEDURE — 84484 ASSAY OF TROPONIN QUANT: CPT | Performed by: INTERNAL MEDICINE

## 2021-08-25 PROCEDURE — A9502 TC99M TETROFOSMIN: HCPCS

## 2021-08-25 PROCEDURE — 78452 HT MUSCLE IMAGE SPECT MULT: CPT

## 2021-08-25 PROCEDURE — 85730 THROMBOPLASTIN TIME PARTIAL: CPT | Performed by: INTERNAL MEDICINE

## 2021-08-25 PROCEDURE — 99239 HOSP IP/OBS DSCHRG MGMT >30: CPT | Performed by: PHYSICIAN ASSISTANT

## 2021-08-25 PROCEDURE — 80048 BASIC METABOLIC PNL TOTAL CA: CPT | Performed by: INTERNAL MEDICINE

## 2021-08-25 PROCEDURE — 93306 TTE W/DOPPLER COMPLETE: CPT

## 2021-08-25 PROCEDURE — 82948 REAGENT STRIP/BLOOD GLUCOSE: CPT

## 2021-08-25 RX ORDER — SODIUM CHLORIDE 9 MG/ML
20 INJECTION, SOLUTION INTRAVENOUS ONCE
Status: CANCELLED | OUTPATIENT
Start: 2021-08-25

## 2021-08-25 RX ORDER — FIBER
1 TABLET ORAL
COMMUNITY

## 2021-08-25 RX ORDER — FLUTICASONE PROPIONATE 50 MCG
2 SPRAY, SUSPENSION (ML) NASAL DAILY
Status: CANCELLED | OUTPATIENT
Start: 2021-08-26

## 2021-08-25 RX ORDER — EZETIMIBE 10 MG/1
10 TABLET ORAL
Status: CANCELLED | OUTPATIENT
Start: 2021-08-25

## 2021-08-25 RX ORDER — POTASSIUM CITRATE 10 MEQ/1
20 TABLET, EXTENDED RELEASE ORAL 2 TIMES DAILY
Status: CANCELLED | OUTPATIENT
Start: 2021-08-25

## 2021-08-25 RX ORDER — ACETAMINOPHEN 325 MG/1
650 TABLET ORAL EVERY 6 HOURS PRN
Status: CANCELLED | OUTPATIENT
Start: 2021-08-25

## 2021-08-25 RX ORDER — ALBUTEROL SULFATE 90 UG/1
3 AEROSOL, METERED RESPIRATORY (INHALATION) ONCE AS NEEDED
Status: CANCELLED | OUTPATIENT
Start: 2021-08-25

## 2021-08-25 RX ORDER — LORATADINE 10 MG/1
10 TABLET ORAL DAILY
Status: CANCELLED | OUTPATIENT
Start: 2021-08-26

## 2021-08-25 RX ORDER — FERROUS SULFATE 325(65) MG
325 TABLET ORAL
Status: CANCELLED | OUTPATIENT
Start: 2021-08-26

## 2021-08-25 RX ORDER — ACETAMINOPHEN 325 MG/1
650 TABLET ORAL ONCE AS NEEDED
Status: CANCELLED | OUTPATIENT
Start: 2021-08-25

## 2021-08-25 RX ORDER — ONDANSETRON 2 MG/ML
4 INJECTION INTRAMUSCULAR; INTRAVENOUS ONCE AS NEEDED
Status: CANCELLED | OUTPATIENT
Start: 2021-08-25

## 2021-08-25 RX ORDER — PANTOPRAZOLE SODIUM 40 MG/1
40 TABLET, DELAYED RELEASE ORAL
Status: CANCELLED | OUTPATIENT
Start: 2021-08-26

## 2021-08-25 RX ADMIN — INSULIN LISPRO 6 UNITS: 100 INJECTION, SOLUTION INTRAVENOUS; SUBCUTANEOUS at 11:07

## 2021-08-25 RX ADMIN — METHYLPREDNISOLONE 32 MG: 16 TABLET ORAL at 00:36

## 2021-08-25 RX ADMIN — FLUTICASONE PROPIONATE 2 SPRAY: 50 SPRAY, METERED NASAL at 11:12

## 2021-08-25 RX ADMIN — HEPARIN SODIUM 2000 UNITS: 1000 INJECTION INTRAVENOUS; SUBCUTANEOUS at 04:42

## 2021-08-25 RX ADMIN — LORATADINE 10 MG: 10 TABLET ORAL at 08:20

## 2021-08-25 RX ADMIN — ACETAMINOPHEN 650 MG: 325 TABLET, FILM COATED ORAL at 03:27

## 2021-08-25 RX ADMIN — HEPARIN SODIUM 11.1 UNITS/KG/HR: 10000 INJECTION, SOLUTION INTRAVENOUS at 12:59

## 2021-08-25 RX ADMIN — DIPHENHYDRAMINE HCL 50 MG: 25 TABLET, COATED ORAL at 01:31

## 2021-08-25 RX ADMIN — REGADENOSON 0.4 MG: 0.08 INJECTION, SOLUTION INTRAVENOUS at 09:37

## 2021-08-25 RX ADMIN — POTASSIUM CITRATE 20 MEQ: 10 TABLET, EXTENDED RELEASE ORAL at 11:08

## 2021-08-25 RX ADMIN — ASPIRIN 324 MG: 81 TABLET, CHEWABLE ORAL at 11:07

## 2021-08-25 RX ADMIN — PANTOPRAZOLE SODIUM 40 MG: 40 TABLET, DELAYED RELEASE ORAL at 05:31

## 2021-08-25 RX ADMIN — FERROUS SULFATE TAB 325 MG (65 MG ELEMENTAL FE) 325 MG: 325 (65 FE) TAB at 08:20

## 2021-08-25 RX ADMIN — ACETAMINOPHEN 650 MG: 325 TABLET, FILM COATED ORAL at 11:55

## 2021-08-25 RX ADMIN — MAGNESIUM OXIDE 400 MG: 400 TABLET ORAL at 08:20

## 2021-08-25 RX ADMIN — IOHEXOL 85 ML: 350 INJECTION, SOLUTION INTRAVENOUS at 02:55

## 2021-08-25 NOTE — ASSESSMENT & PLAN NOTE
· Given patient's dry cough and refusal to be vaccinated, and the fact that she is scheduled to go to a large concert on Saturday, I requested a COVID-19 test with patient's approval and patient was found to be positive    I notified her of this resulted and contacted Infectious Disease service to determine if we could arrange monoclonal antibodies for her as an outpatient as she was agreeable

## 2021-08-25 NOTE — PROGRESS NOTES
General Cardiology   Progress Note -  Team One   Meño Sorto 64 y o  female MRN: [id number]    Unit/Bed#: S -01 Encounter: [id number]    Assessment:    1  Chest pain:  Reports chest heaviness that lasted approximately 2 hours at rest   Denies any exertional symptoms  EKG with no acute ischemic change  Peak Troponin 0 70  Elevated D-dimer  CXR with no acute cardiopulmonary disease  CTA negative for PE  Patient currently chest pain-free  Nuclear stress test today  2  Near-syncope:  Reported episode of lightheadedness/dizziness, near syncope with associated fast heart rate while in the shower  It reoccurred 3 more times with changes in position from sitting to standing  Denied any loss of consciousness  May be in the setting of dehydration with poor fluid intake over the last few days  Telemetry with no arrhythmias noted thus far  TSH stable  Orthostatic vital signs negative  · Reports improvement in symptoms after receiving IV fluids  · Echocardiogram with moderate-severe AS as below  3  Moderate-severe AS:  KP 0 8 cm2 w/ MG 34 mmHg  Denies syncope  4  Preserved biventricular systolic function:  EF 88% no WMA, G1DD, normal RV function  5  Dyslipidemia:  With reported statin intolerance  Maintained on Zetia 10 mg daily  6  Type 2 DM:  Now Hemoglobin A1c on file  Management per primary team   7  History of TIA in 2013  8  Carotid artery stenosis: s/p left CEA in 2013    9  Dye allergy      Plan/Recommendations:  · Follow-up on nuclear stress test  · If stress test is abnormal then would proceed with cardiac catheterization for definitive evaluation of coronary anatomy and potential intervention  If stress test is without significant ischemia then no further ischemic workup would be indicated  · Advised patient to establish care with a cardiologist in [place] to closely monitor AS as she will likely need valvular intervention in the future    · Would also recommend an event monitor to rule out arrhythmias given her history of palpitations  ·     __________________________________________________________________________________    Subjective    Patient seen and examined  No acute events overnight  She denies any chest pain, shortness of breath or palpitations  She does any recurrent lightheadedness or dizziness  She is anxious to be discharged and fly home to [place] this weekend  Review of Systems   Constitutional: Negative  Negative for chills  Cardiovascular: Negative for chest pain, dyspnea on exertion, leg swelling, near-syncope, orthopnea, palpitations, paroxysmal nocturnal dyspnea and syncope  Respiratory: Negative  Negative for cough, shortness of breath and wheezing  Endocrine: Negative  Hematologic/Lymphatic: Negative  Skin: Negative  Musculoskeletal: Negative  Gastrointestinal: Negative  Negative for diarrhea, nausea and vomiting  Neurological: Negative for dizziness, light-headedness and weakness  Psychiatric/Behavioral: Negative  Negative for altered mental status  All other systems reviewed and are negative  Objective:   Vitals: Blood pressure 134/76, pulse 70, temperature 97 7 °F (36 5 °C), temperature source Oral, resp  rate 18, height 5' 4" (1 626 m), weight 107 kg (235 lb 14 3 oz), SpO2 96 %  ,     Wt Readings from Last 3 Encounters:   08/25/21 107 kg (235 lb 14 3 oz)        Lab Results   Component Value Date    CREATININE 0 92 08/25/2021    CREATININE 1 05 08/24/2021         Body mass index is 40 49 kg/m²  ,     Systolic (97RMR), XAY:975 , Min:113 , SIK:210     Diastolic (55IBD), QXI:04, Min:55, Max:83          Intake/Output Summary (Last 24 hours) at 8/25/2021 1104  Last data filed at 8/24/2021 1107  Gross per 24 hour   Intake 1000 ml   Output --   Net 1000 ml     Weight (last 2 days)     Date/Time   Weight    08/25/21 0050   107 (235 89)    08/24/21 1505   107 (236 99)    08/24/21 0856   107 (235)            Telemetry Review: No significant arrhythmias seen on telemetry review  Physical Exam  Vitals and nursing note reviewed  Constitutional:       General: She is not in acute distress  Appearance: She is well-developed  She is obese  Comments: On RA in NAD   HENT:      Head: Normocephalic and atraumatic  Neck:      Vascular: No JVD  Cardiovascular:      Rate and Rhythm: Normal rate and regular rhythm  Heart sounds: Murmur heard  No friction rub  Pulmonary:      Effort: Pulmonary effort is normal  No respiratory distress  Breath sounds: Normal breath sounds  No wheezing or rales  Abdominal:      General: Bowel sounds are normal  There is no distension  Palpations: Abdomen is soft  Tenderness: There is no abdominal tenderness  Musculoskeletal:         General: No tenderness  Normal range of motion  Cervical back: Normal range of motion and neck supple  Right lower leg: No edema  Left lower leg: No edema  Skin:     General: Skin is warm and dry  Findings: No erythema  Neurological:      Mental Status: She is alert and oriented to person, place, and time  Psychiatric:         Behavior: Behavior normal          Thought Content:  Thought content normal          Judgment: Judgment normal          LABORATORY RESULTS  Results from last 7 days   Lab Units 08/25/21  0334 08/24/21  1742 08/24/21  1435   TROPONIN I ng/mL 0 35* 0 70* 0 59*     CBC with diff:   Results from last 7 days   Lab Units 08/25/21  0408 08/24/21  1410 08/24/21  1004   WBC Thousand/uL 4 15* 4 06* 5 34   HEMOGLOBIN g/dL 12 6 13 1 13 7   HEMATOCRIT % 37 8 38 3 40 9   MCV fL 94 93 94   PLATELETS Thousands/uL 209 239 207   MCH pg 31 2 31 7 31 6   MCHC g/dL 33 3 34 2 33 5   RDW % 13 0 13 1 13 1   MPV fL 10 0 10 0 9 7   NRBC AUTO /100 WBCs 0  --  0       CMP:  Results from last 7 days   Lab Units 08/25/21  0410 08/24/21  1004   POTASSIUM mmol/L 4 1 4 0   CHLORIDE mmol/L 103 103   CO2 mmol/L 24 24   BUN mg/dL 14 13 CREATININE mg/dL 0 92 1 05   CALCIUM mg/dL 8 6 8 9   AST U/L  --  37   ALT U/L  --  69   ALK PHOS U/L  --  92   EGFR ml/min/1 73sq m 70 60       BMP:  Results from last 7 days   Lab Units 21  0410 21  1004   POTASSIUM mmol/L 4 1 4 0   CHLORIDE mmol/L 103 103   CO2 mmol/L 24 24   BUN mg/dL 14 13   CREATININE mg/dL 0 92 1 05   CALCIUM mg/dL 8 6 8 9       No results found for: NTBNP                        Results from last 7 days   Lab Units 21  0410   TSH 3RD GENERATON uIU/mL 0 507       Results from last 7 days   Lab Units 21  1410   INR  0 96       Lipid Profile:   No results found for: CHOL  No results found for: HDL  No results found for: LDLCALC  No results found for: TRIG    Cardiac testing:   Results for orders placed during the hospital encounter of 21    Echo complete with contrast if indicated    Narrative  [place] [address], [address] [facility]  [phone]    Transthoracic Echocardiogram  2D, M-mode, Doppler, and Color Doppler    Study date:  25-Aug-2021    Patient: Melba Trejo  MR number: [id number]  Account number: [de-identified]  : 1965  Age: 64 years  Gender: Female  Status: Inpatient  Location: Bedside  Height: 64 in  Weight: 234 5 lb  BP: 142/ 81 mmHg    Indications: Murmur  Diagnoses: R01 1 - Cardiac murmur, unspecified    Sonographer:  Benny Rivas RDCS  Referring Physician:  Jay Almonte MD  Group:  [facility]  Interpreting Physician:  Lilia Mathew MD    SUMMARY    LEFT VENTRICLE:  Systolic function was normal  Ejection fraction was estimated to be 65 %  There were no regional wall motion abnormalities  Wall thickness was mildly increased  There was mild concentric hypertrophy  Doppler parameters were consistent with abnormal left ventricular relaxation (grade 1 diastolic dysfunction)  LEFT ATRIUM:  The atrium was mildly dilated  MITRAL VALVE:  There was mild annular calcification    There was trace regurgitation  AORTIC VALVE:  The valve was trileaflet  Leaflets exhibited moderately increased thickness, moderate calcification, and moderately reduced cuspal separation  Transaortic velocity was increased due to valvular stenosis  There was moderate to severe stenosis  There was mild regurgitation  The peak valve velocity was 385 cm/s  Valve mean gradient was 34 mmHg  Estimated aortic valve area (by VTI) was 0 8 cmï¾²  TRICUSPID VALVE:  There was mild regurgitation  HISTORY: PRIOR HISTORY: DM2  Dyslipidemia  Former smoker  PROCEDURE: The procedure was performed at the bedside  This was a routine study  The transthoracic approach was used  The study included complete 2D imaging, M-mode, complete spectral Doppler, and color Doppler  Image quality was adequate  LEFT VENTRICLE: Size was normal  Systolic function was normal  Ejection fraction was estimated to be 65 %  There were no regional wall motion abnormalities  Wall thickness was mildly increased  There was mild concentric hypertrophy  DOPPLER: Doppler parameters were consistent with abnormal left ventricular relaxation (grade 1 diastolic dysfunction)  RIGHT VENTRICLE: The size was normal  Systolic function was normal  Wall thickness was normal     LEFT ATRIUM: The atrium was mildly dilated  RIGHT ATRIUM: Size was normal     MITRAL VALVE: There was mild annular calcification  Valve structure was normal  There was normal leaflet separation  DOPPLER: The transmitral velocity was within the normal range  There was no evidence for stenosis  There was trace  regurgitation  AORTIC VALVE: The valve was trileaflet  Leaflets exhibited moderately increased thickness, moderate calcification, and moderately reduced cuspal separation  DOPPLER: Transaortic velocity was increased due to valvular stenosis  There was  moderate to severe stenosis  There was mild regurgitation      TRICUSPID VALVE: The valve structure was normal  There was normal leaflet separation  DOPPLER: The transtricuspid velocity was within the normal range  There was no evidence for stenosis  There was mild regurgitation  Pulmonary artery  systolic pressure was within the normal range  Estimated peak PA pressure was 25 mmHg  PULMONIC VALVE: Leaflets exhibited normal thickness, no calcification, and normal cuspal separation  DOPPLER: The transpulmonic velocity was within the normal range  There was trace regurgitation  PERICARDIUM: There was no pericardial effusion  The pericardium was normal in appearance  AORTA: The root exhibited normal size  SYSTEMIC VEINS: IVC: The inferior vena cava was not well visualized  PULMONARY VEINS: DOPPLER: Doppler flow pattern was normal in the pulmonary vein(s)      MEASUREMENT TABLES    2D MEASUREMENTS  LVOT   (Reference normals)  Diam   21 mm   (--)    DOPPLER MEASUREMENTS  LVOT   (Reference normals)  Peak gui   87 cm/s   (--)  Mean gui   61 cm/s   (--)  VTI   22 5 cm   (--)  Peak gradient   3 mmHg   (--)  Mean gradient   1 6 mmHg   (--)  Stroke vol   77 93 ml   (--)  Aortic valve   (Reference normals)  Peak gui   385 cm/s   (--)  Mean gui   275 cm/s   (--)  VTI   100 cm   (--)  Peak gradient   61 mmHg   (--)  Mean gradient   34 mmHg   (--)  Obstr index, VTI   0 23    (--)  Valve area, VTI   0 8 cmï¾²   (--)  Area index, VTI   0 38 cmï¾²/mï¾²   (--)  Obstr index, Vmax   0 23    (--)  Valve area, Vmax   0 8 cmï¾²   (--)  Area index, Vmax   0 38 cmï¾²/mï¾²   (--)  Obstr index, Vmean   0 22    (--)  Valve area, Vmean   0 76 cmï¾²   (--)  Area index, Vmean   0 36 cmï¾²/mï¾²   (--)    SYSTEM MEASUREMENT TABLES    2D  %FS: 44 6 %  Ao Diam: 3 25 cm  Ao asc: 3 17 cm  EDV(Teich): 98 41 ml  EF(Teich): 75 93 %  ESV(Teich): 23 69 ml  IVSd: 1 2 cm  LA Area: 19 29 cm2  LA Diam: 4 05 cm  LVEDV MOD A4C: 87 29 ml  LVEF MOD A4C: 64 07 %  LVESV MOD A4C: 31 36 ml  LVIDd: 4 62 cm  LVIDs: 2 56 cm  LVLd A4C: 8 04 cm  LVLs A4C: 6 62 cm  LVOT Diam: 2 05 cm  LVPWd: 1 16 cm  RA Area: 11 91 cm2  RVIDd: 3 18 cm  SV MOD A4C: 55 93 ml  SV(Teich): 74 72 ml    CW  AR Dec [place]: 2 92 m/s2  AR Dec Time: 1584 09 ms  AR PHT: 459 39 ms  AR Vmax: 4 62 m/s  AR maxP 55 mmHg  AV Env  Ti: 346 34 ms  AV MaxP 46 mmHg  AV VTI: 92 22 cm  AV Vmax: 3 65 m/s  AV Vmean: 2 66 m/s  AV meanP 32 mmHg  TR MaxP 37 mmHg  TR Vmax: 2 26 m/s    MM  TAPSE: 2 17 cm    PW  KP (VTI): 0 75 cm2  KP Vmax: 0 75 cm2  AVAI (VTI): 0 cm2/m2  AVAI Vmax: 0 cm2/m2  E' Sept: 0 06 m/s  E/E' Sept: 13 98  LVOT Env  Ti: 359 66 ms  LVOT VTI: 21 02 cm  LVOT Vmax: 0 82 m/s  LVOT Vmean: 0 58 m/s  LVOT maxP 71 mmHg  LVOT meanP 52 mmHg  LVSI Dopp: 33 29 ml/m2  LVSV Dopp: 69 58 ml  MV A Rex: 0 93 m/s  MV Dec [place]: 4 34 m/s2  MV DecT: 205 57 ms  MV E Rex: 0 89 m/s  MV E/A Ratio: 0 96  MV PHT: 59 62 ms  MVA By PHT: 3 69 cm2    [facility] Accredited Echocardiography Laboratory    Prepared and electronically signed by    Yandy Berry MD  Signed 25-Aug-2021 08:17:51    No results found for this or any previous visit  No results found for this or any previous visit  No valid procedures specified  No results found for this or any previous visit          Meds/Allergies   all current active meds have been reviewed, current meds:   Current Facility-Administered Medications   Medication Dose Route Frequency    acetaminophen (TYLENOL) tablet 650 mg  650 mg Oral Q6H PRN    aspirin chewable tablet 324 mg  324 mg Oral Daily    ezetimibe (ZETIA) tablet 10 mg  10 mg Oral HS    ferrous sulfate tablet 325 mg  325 mg Oral Daily With Breakfast    fluticasone (FLONASE) 50 mcg/act nasal spray 2 spray  2 spray Nasal Daily    heparin (porcine) 25,000 units in 0 45% NaCl 250 mL infusion (premix)  3-20 Units/kg/hr (Order-Specific) Intravenous Titrated    heparin (porcine) injection 2,000 Units  2,000 Units Intravenous Q1H PRN    heparin (porcine) injection 4,000 Units  4,000 Units Intravenous Q1H PRN    insulin lispro (HumaLOG) 100 units/mL subcutaneous injection 1-6 Units  1-6 Units Subcutaneous TID AC    loratadine (CLARITIN) tablet 10 mg  10 mg Oral Daily    magnesium oxide (MAG-OX) tablet 400 mg  400 mg Oral Daily    ondansetron (ZOFRAN) injection 4 mg  4 mg Intravenous Q6H PRN    pantoprazole (PROTONIX) EC tablet 40 mg  40 mg Oral Early Morning    potassium citrate (UROCIT-K) CR tablet 20 mEq  20 mEq Oral BID    and PTA meds:   Prior to Admission Medications   Prescriptions Last Dose Informant Patient Reported? Taking?    Biotin 10 MG TABS 2021 at Unknown time  Yes Yes   Sig: Take 10 mg by mouth   Cyanocobalamin ER 1500 MCG TBCR 2021 at Unknown time  Yes Yes   Sig: Take by mouth   MAGNESIUM PO 2021 at Unknown time  Yes Yes   Sig: Take 400 mg by mouth   Multiple Vitamin (MULTIVITAMIN ADULT PO) 2021 at Unknown time  Yes Yes   Sig: Take 1 tablet by mouth   Probiotic Product (Misc Intestinal Zonia Regulat) CAPS 2021 at Unknown time  Yes Yes   Sig: Take by mouth   Zinc 50 MG CAPS 2021 at Unknown time  Yes Yes   Sig: Take 50 mg by mouth   calcium citrate-Vitamin D 200 mg-250 units 2021 at Unknown time  Yes Yes   Sig: Take by mouth   cyanocobalamin (VITAMIN B-12) 1000 MCG tablet 2021 at Unknown time  Yes Yes   Sig: Take by mouth   ezetimibe (ZETIA) 10 mg tablet 2021 at Unknown time  Yes Yes   Sig: Take 10 mg by mouth   ferrous sulfate 325 (65 Fe) mg tablet 2021 at Unknown time  Yes Yes   Sig: Take 325 mg by mouth   fluticasone (FLONASE) 50 mcg/act nasal spray 2021 at Unknown time  Yes Yes   Si sprays into each nostril daily   loratadine (CLARITIN) 10 mg tablet 2021 at Unknown time  Yes Yes   Sig: Take 10 mg by mouth as needed   metFORMIN (GLUCOPHAGE) 850 mg tablet 2021 at Unknown time  Yes Yes   Sig: Take 1,000 mg by mouth 2 (two) times a day   omeprazole (PriLOSEC) 20 mg delayed release capsule 2021 at Unknown time  Yes Yes Sig: Take 20 mg by mouth daily   potassium citrate (UROCIT-K) 10 mEq 8/23/2021 at Unknown time  Yes Yes   Sig: Take 20 mEq by mouth   semaglutide, 1 mg/dose, (Ozempic, 1 MG/DOSE,) 4 MG/3ML SOPN injection pen Past Week at Unknown time  Yes Yes   Sig: Inject 1 mg under the skin once a week      Facility-Administered Medications: None     Medications Prior to Admission   Medication    Biotin 10 MG TABS    calcium citrate-Vitamin D 200 mg-250 units    cyanocobalamin (VITAMIN B-12) 1000 MCG tablet    Cyanocobalamin ER 1500 MCG TBCR    ezetimibe (ZETIA) 10 mg tablet    ferrous sulfate 325 (65 Fe) mg tablet    fluticasone (FLONASE) 50 mcg/act nasal spray    loratadine (CLARITIN) 10 mg tablet    MAGNESIUM PO    metFORMIN (GLUCOPHAGE) 850 mg tablet    Multiple Vitamin (MULTIVITAMIN ADULT PO)    omeprazole (PriLOSEC) 20 mg delayed release capsule    potassium citrate (UROCIT-K) 10 mEq    Probiotic Product (Misc Intestinal Zonia Regulat) CAPS    semaglutide, 1 mg/dose, (Ozempic, 1 MG/DOSE,) 4 MG/3ML SOPN injection pen    Zinc 50 MG CAPS       heparin (porcine), 3-20 Units/kg/hr (Order-Specific), Last Rate: 13 1 Units/kg/hr (08/25/21 0401)        Counseling / Coordination of Care  Total floor / unit time spent today 20 minutes  Greater than 50% of total time was spent with the patient and / or family counseling and / or coordination of care  ** Please Note: Dragon 360 Dictation voice to text software may have been used in the creation of this document   **

## 2021-08-25 NOTE — DISCHARGE SUMMARY
[facility]  Discharge- Yvrose Mejia 1965, 64 y o  female MRN: [id number]  Unit/Bed#: S -01 Encounter: [id number]  Primary Care Provider: No primary care provider on file  Date and time admitted to hospital: 8/24/2021  9:06 AM    COVID-19  Assessment & Plan  · Given patient's dry cough and refusal to be vaccinated, and the fact that she is scheduled to go to a large concert on Saturday, I requested a COVID-19 test with patient's approval and patient was found to be positive  I notified her of this resulted and contacted Infectious Disease service to determine if we could arrange monoclonal antibodies for her as an outpatient as she was agreeable    Type 2 diabetes mellitus [facility])  Assessment & Plan  Lab Results   Component Value Date    HGBA1C 8 3 (H) 08/25/2021       Recent Labs     08/24/21  0913 08/24/21  1640 08/25/21  0801 08/25/21  1105   POCGLU 216* 240* 218* 352*       Blood Sugar Average: Last 72 hrs:  (P) 256 5   A1c last year 6 9  Now significantly higher at 8 3  Also with element of steroid induced hyperglycemia currently due to patient having received Medrol prep for CT scan  Recently initiated Ozempic in conjunction with metformin--I recommended that she follow up closely with her PCP  Work on [employer] loss    Near syncope  Assessment & Plan  Near syncopal episodes associated with palpitations  Moderate to severe AS noted  Outpatient Zio patch was recommended but should be arranged in her home [facility] [place]    Class 3 severe obesity in adult [facility])  Assessment & Plan  · BMI greater than 40  Recommend weight loss    Dyslipidemia  Assessment & Plan  Reviewed lipid panel  Continue Zetia  Reported Statin allergy  H/O carotid endarterectomy  Assessment & Plan  Left CEA many years ago  She had TIA with transient speech difficulty at the time  Discharging Physician / Practitioner: Kalpana Marcano PA-C  PCP: No primary care provider on file    Admission Date:   Admission Orders (From admission, onward)     Ordered        08/24/21 Pierre Saavedra  Once                   Discharge Date: 08/25/21    Medical Problems     Resolved Problems  Date Reviewed: 8/25/2021    None                Consultations During Hospital Stay:  · Cardiology    Procedures Performed:   · CTA chest PE study no PE  · Chest x-ray no active disease  · Nuclear Myoview stress test normal  · 2D echocardiogram moderate to severe aortic stenosis and mild regurgitation, ejection fraction 65%  Wall thickness was mildly increased with concentric hypertrophy  Grade 1 diastolic dysfunction    Significant Findings / Test Results:   · As above    Incidental Findings:   · Right paramedian basilar infiltrate noted   · 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    Test Results Pending at Discharge (will require follow up): · None     Outpatient Tests Requested:  · Consider upper GI series  · Consider repeat CT chest to follow-up on right-sided infiltrate    Complications:  None    Reason for Admission:  Chest pain    Hospital Course:     Cristo Sheldon is a 64 y o  female patient who is visiting from [place], who originally presented to the hospital on 8/24/2021 due to chest pain, palpitations and near syncope  Patient was noted to have elevated troponin and was seen in consultation by Cardiology and placed on empiric heparin drip  A 2D echocardiogram revealed moderate to severe aortic stenosis  Nuclear Myoview stress test was performed and was noted to be normal   Cardiology did not feel that inpatient cardiac catheterization was indicated but rather requested outpatient 2 week heart rhythm monitor and outpatient cardiology follow-up regarding the aortic stenosis  Patient did not require any additional medication changes from Cardiology perspective    She is noted to have morbid obesity and uncontrolled diabetes and was advised to have close follow-up with her family doctor    Of note patient was found on the day of discharge to test positive for COVID-19 and we were able to help make arrangements for her to have outpatient monoclonal antibody infusion based on her significant risk factors  The patient, initially admitted to the hospital as inpatient, was discharged earlier than expected given the following: Despite elevated troponin, given the rest of her workup as noted, inpatient cardiac catheterization was not felt to be indicated and she was feeling better  Please see above list of diagnoses and related plan for additional information  Condition at Discharge: fair     Discharge Day Visit / Exam:     Subjective:  Patient reported some mild dry cough but no fever  She denied any abdominal pain or current complaints of chest pain, palpitations or dizziness  She was eager to go home  Vitals: Blood Pressure: 134/76 (08/25/21 0758)  Pulse: 70 (08/25/21 0758)  Temperature: 97 7 °F (36 5 °C) (08/25/21 0758)  Temp Source: Oral (08/25/21 0758)  Respirations: 18 (08/25/21 0758)  Height: 5' 4" (162 6 cm) (08/25/21 0050)  Weight - Scale: 107 kg (235 lb 14 3 oz) (08/25/21 0050)  SpO2: 96 % (08/25/21 0758)  Exam:   Physical Exam  Vitals reviewed  Constitutional:       General: She is not in acute distress  Appearance: She is obese  She is not ill-appearing, toxic-appearing or diaphoretic  Eyes:      General: No scleral icterus  Right eye: No discharge  Left eye: No discharge  Conjunctiva/sclera: Conjunctivae normal    Cardiovascular:      Rate and Rhythm: Normal rate and regular rhythm  Heart sounds: Murmur (loud HEMANT noted on exam) heard  Pulmonary:      Effort: No respiratory distress  Breath sounds: No stridor  No wheezing or rhonchi  Comments: Noted that patient had mild dry cough  No dyspnea, tachypnea, wheezing or distress  Abdominal:      General: There is no distension  Palpations: Abdomen is soft  Tenderness: There is no abdominal tenderness  There is no guarding  Musculoskeletal:         General: No swelling, tenderness, deformity or signs of injury  Right lower leg: No edema  Left lower leg: No edema  Skin:     General: Skin is warm and dry  Coloration: Skin is not jaundiced or pale  Findings: No bruising, erythema, lesion or rash  Neurological:      General: No focal deficit present  Mental Status: She is alert  Mental status is at baseline  Discussion with Family:  offered, patient declined    Discharge instructions/Information to patient and family:   See after visit summary for information provided to patient and family  Provisions for Follow-Up Care:  See after visit summary for information related to follow-up care and any pertinent home health orders  Disposition:  Home    Planned Readmission: none     Discharge Statement:  I spent 45 minutes discharging the patient  This time was spent on the day of discharge  I had direct contact with the patient on the day of discharge  Greater than 50% of the total time was spent examining patient, answering all patient questions, arranging and discussing plan of care with patient as well as directly providing post-discharge instructions  Additional time then spent on discharge activities  Bedside nursing rounds performed  Case was discussed with Cardiology  Patient was seen and evaluated both in the morning before her stress test and after as well    Also spoke with Infectious Disease and outpatient coordinators    Discharge Medications:  See after visit summary for reconciled discharge medications provided to patient and family        ** Please Note: This note has been constructed using a voice recognition system **

## 2021-08-25 NOTE — ASSESSMENT & PLAN NOTE
Near syncopal episodes associated with palpitations  Moderate to severe AS noted  Outpatient Zio patch was recommended but should be arranged in her home state of [address]

## 2021-08-25 NOTE — UTILIZATION REVIEW
Initial Clinical Review    Admission: Date/Time/Statement:   Admission Orders (From admission, onward)     Ordered        08/24/21 1103  INPATIENT ADMISSION  Once                   Orders Placed This Encounter   Procedures    INPATIENT ADMISSION     Standing Status:   Standing     Number of Occurrences:   1     Order Specific Question:   Level of Care     Answer:   Med Surg [16]     Order Specific Question:   Estimated length of stay     Answer:   More than 2 Midnights     Order Specific Question:   Certification     Answer:   I certify that inpatient services are medically necessary for this patient for a duration of greater than two midnights  See H&P and MD Progress Notes for additional information about the patient's course of treatment  ED Arrival Information     Expected Arrival Acuity    - 8/24/2021 08:45 Urgent         Means of arrival Escorted by Service Admission type    Walk-In Self Hospitalist Urgent         Arrival complaint    near syncope/cough        Chief Complaint   Patient presents with    Syncope     near syncopal episode x4 this am       Initial Presentation: 65 yo female PMHX of TIA, carotid stenosis status post L CEA, DMII, dyslipidemia and heart murmur, driving long car rides freq  to ED from home presents w chest pressure/ lightheadedness- dizziness  Reports chest pressure w dizziness & dry cough  In baseline health night prior & dev dry non productive cough & sought Urgent care eval  DC for allergy condition  Symptoms progressed w intermittent pressure in middle of chest w lightheadedness & pre syncope in the shower after 2 HR at rest  Experienced 3 more episodes from sitting to standing positions w palpitations  IN ED: trop mildly elevated  , /60, exam tachycardia, Murmur (Holosystolic, 2/4, best heard at the left sternal border at the 2nd intercostal space ) SALINA=3  Received IVF   Admit Inpatient med surg due to Chest pain, near Syncope, undiagnosed cardiac murmurs, DM type 2, HX of carotid endarterectomy   PLAN: telemetry, serial enzymes, D dimer,  cardiology eval , SSI  CARDIOLOGY  Repeated symptoms of dizziness /near syncope w palpitations x4; w/ chest pressure  Mildly elevated trops  Type 1 ir type 2 MI  Initiated on asa & IV Heparin  Obtain echo, trops & will req Ischemic testing nuclear stress v cath  IF no further recurrence of CP, echo wo regional wall motion abn or reducttion in EF & trops wo rise then proceed to Nuclear stress  Date: 8/25   Day 2:   Cardiology  Reports chest heaviness that lasted approx 2 HR at rest wo exertional symptoms  Peak trop=0 7  Elevated D dimer, Currently CP free for NM stress  ECHO reveals MOD -sev AS  Cont  Telemetry  If stress test is ABN proceed w cardiac cath for definitive eval of coronary anatomy w potential intervention  IF NM stress wo signif ischemia no further ischemic workup is indicated  Rec establish care w cardiology [facility] [address] [facility] to monitor AS will likely req valvular intervention in future   Rec event monitor to rule out palpitations  ED Triage Vitals [08/24/21 0856]   Temperature Pulse Respirations Blood Pressure SpO2   99 4 °F (37 4 °C) (!) 106 16 111/68 99 %      Temp Source Heart Rate Source Patient Position - Orthostatic VS BP Location FiO2 (%)   Oral Monitor Sitting Left arm --      Pain Score       2          Wt Readings from Last 1 Encounters:   08/25/21 107 kg (235 lb 14 3 oz)     Additional Vital Signs:   Date/Time  Temp  Pulse  Resp  BP  MAP (mmHg)  SpO2  O2 Device  Patient Position - Orthostatic VS   08/25/21 0758  97 7 °F (36 5 °C)  70  18  134/76  99  96 %  None (Room air)  Lying   08/25/21 0050  98 3 °F (36 8 °C)  82  18  142/81  106  97 %  None (Room air)  Lying   08/24/21 2340  98 9 °F (37 2 °C)  85  18  118/55  --  96 %  None (Room air)  --   08/24/21 2030  --  84  --  --  --  --  --  --   08/24/21 1922  --  101  16  142/70  103  96 %  None (Room air)  Standing for 3 minutes - Orthostatic VS   08/24/21 1918  --  98  18 151/83  105  96 %  None (Room air)  Standing - Orthostatic VS   08/24/21 1917  --  93  16  131/76  95  96 %  None (Room air)  Sitting - Orthostatic VS   08/24/21 1915  --  87  16  134/74  86  94 %  None (Room air)  Lying - Orthostatic VS   08/24/21 1718  --  90  18  130/61  88  97 %  None (Room air)  Lying   08/24/21 1505  --  84  18  113/59  --  99 %  None (Room air)  Lying   08/24/21 1100  --  92  18  107/57  75  95 %  None (Room air)  Lying   08/24/21 1010  --  96  18  112/64  --  97 %  None (Room air)  Sitting   08/24/21 0939  --  --  --  --  --  --  None (Room air)  --   08/24/21 0856  99 4 °F (37 4 °C)  106Abnormal   16  111/68  --  99 %  None (Room air)  Sitting      Weights (last 14 days)    Date/Time  Weight  Weight Method  Height   08/25/21 0050  107 kg (235 lb 14 3 oz)  Stated   5' 4" (1 626 m)   Weight Method: per E  D  weight at 08/25/21 0050   08/24/21 1505  107 kg (236 lb 15 9 oz)  Bed scale  --   08/24/21 0856  107 kg (235 lb)  --         Pertinent Labs/Diagnostic Test Results:       Results from last 7 days   Lab Units 08/25/21  0408 08/24/21  1410 08/24/21  1004   WBC Thousand/uL 4 15* 4 06* 5 34   HEMOGLOBIN g/dL 12 6 13 1 13 7   HEMATOCRIT % 37 8 38 3 40 9   PLATELETS Thousands/uL 209 239 207   NEUTROS ABS Thousands/µL 2 88  --  3 74         Results from last 7 days   Lab Units 08/25/21  0410 08/24/21  1004   SODIUM mmol/L 137 139   POTASSIUM mmol/L 4 1 4 0   CHLORIDE mmol/L 103 103   CO2 mmol/L 24 24   ANION GAP mmol/L 10 12   BUN mg/dL 14 13   CREATININE mg/dL 0 92 1 05   EGFR ml/min/1 73sq m 70 60   CALCIUM mg/dL 8 6 8 9     Results from last 7 days   Lab Units 08/24/21  1004   AST U/L 37   ALT U/L 69   ALK PHOS U/L 92   TOTAL PROTEIN g/dL 7 0   ALBUMIN g/dL 3 7   TOTAL BILIRUBIN mg/dL 0 36     Results from last 7 days   Lab Units 08/25/21  1105 08/25/21  0801 08/24/21  1640 08/24/21  0913   POC GLUCOSE mg/dl 352* 218* 240* 216*     Results from last 7 days   Lab Units 08/25/21  0410 08/24/21  1004   GLUCOSE RANDOM mg/dL 218* 223*         Results from last 7 days   Lab Units 08/25/21  0410   HEMOGLOBIN A1C % 8 3*   EAG mg/dl 192     No results found for: BETA-HYDROXYBUTYRATE                   Results from last 7 days   Lab Units 08/25/21  0334 08/24/21  1742 08/24/21  1435 08/24/21  1004   TROPONIN I ng/mL 0 35* 0 70* 0 59* 0 21*     Results from last 7 days   Lab Units 08/24/21  1140   D-DIMER QUANTITATIVE ug/ml FEU 0 65*     Results from last 7 days   Lab Units 08/25/21  0334 08/24/21  2048 08/24/21  1410   PROTIME seconds  --   --  12 9   INR   --   --  0 96   PTT seconds 51* 60* 29     Results from last 7 days   Lab Units 08/25/21  0410   TSH 3RD GENERATON uIU/mL 0 507         CTA chest pe study   Final Result by  MD (08/25 1532)      Right basilar paramedian infiltrate is noted           Patient is post gastric bypass  There appears to be a superiorly projecting diverticulum from the excluded portion of the stomach  Consider dedicated upper GI in the appropriate clinical setting if indicated clinically  No pulmonary embolism or aortic aneurysm or dissection  XR chest 2 views   Final Result by , MD (08/24 6320)      No acute cardiopulmonary disease  8/26 NM  Perfusion test=IMPRESSIONS: Normal study after pharmacologic vasodilation  Myocardial perfusion imaging was normal at rest and with stress  Left ventricular systolic function was normal    8/26 ECHO=LEFT VENTRICLE:  Systolic function was normal  Ejection fraction was estimated to be 65 %  There were no regional wall motion abnormalities  Wall thickness was mildly increased  There was mild concentric hypertrophy  Doppler parameters were consistent with abnormal left ventricular relaxation (grade 1 diastolic dysfunction)  AORTIC VALVE:  The valve was trileaflet  Leaflets exhibited moderately increased thickness, moderate calcification, and moderately reduced cuspal separation    Transaortic velocity was increased due to valvular stenosis  There was moderate to severe stenosis  There was mild regurgitation  The peak valve velocity was 385 cm/s  Valve mean gradient was 34 mmHg  Estimated aortic valve area (by VTI) was 0 8 cmï¾²      8/25 ekg nsr     ED Treatment:   Medication Administration from 08/24/2021 0843 to 08/25/2021 0049       Date/Time Order Dose Route Action     08/24/2021 1007 lactated ringers bolus 1,000 mL 1,000 mL Intravenous New Bag     08/24/2021 1114 aspirin tablet 325 mg 325 mg Oral Given     08/24/2021 1817 potassium citrate (UROCIT-K) CR tablet 20 mEq 20 mEq Oral Given     08/24/2021 2116 ezetimibe (ZETIA) tablet 10 mg 10 mg Oral Given     08/24/2021 1511 fluticasone (FLONASE) 50 mcg/act nasal spray 2 spray 2 spray Nasal Given     08/24/2021 1510 loratadine (CLARITIN) tablet 10 mg 10 mg Oral Given     08/24/2021 1558 magnesium oxide (MAG-OX) tablet 400 mg 400 mg Oral Given     08/24/2021 1818 insulin lispro (HumaLOG) 100 units/mL subcutaneous injection 1-6 Units 3 Units Subcutaneous Given     08/24/2021 1415 sodium chloride 0 9 % infusion 75 mL/hr Intravenous New Bag     08/24/2021 1434 methylPREDNISolone (MEDROL) tablet 32 mg 32 mg Oral Given     08/24/2021 1411 diphenhydrAMINE (BENADRYL) tablet 50 mg 50 mg Oral Given     08/24/2021 1507 heparin (porcine) injection 4,000 Units 4,000 Units Intravenous Given     08/24/2021 1508 heparin (porcine) 25,000 units in 0 45% NaCl 250 mL infusion (premix) 11 1 Units/kg/hr Intravenous New Bag     08/25/2021 0036 methylPREDNISolone (MEDROL) tablet 32 mg 32 mg Oral Given        Past Medical History:   Diagnosis Date    Carotid artery calcification, left     CEA 2013    Diabetes mellitus ([address] )     Heart murmur      Present on Admission:  **None**      Admitting Diagnosis: Cough [R05]  Dizziness [R42]  Chest pain [R07 9]  Volume depletion [E86 9]  Elevated troponin [R77 8]  Age/Sex: 64 y o  female  Admission Orders:  Telemetry  NM stress  Orthostatic bp  Up w assist    Scheduled Medications:  aspirin, 324 mg, Oral, Daily  ezetimibe, 10 mg, Oral, HS  ferrous sulfate, 325 mg, Oral, Daily With Breakfast  fluticasone, 2 spray, Nasal, Daily  insulin lispro, 1-6 Units, Subcutaneous, TID AC  loratadine, 10 mg, Oral, Daily  magnesium oxide, 400 mg, Oral, Daily  pantoprazole, 40 mg, Oral, Early Morning  potassium citrate, 20 mEq, Oral, BID    Continuous IV Infusions:  heparin (porcine), 3-20 Units/kg/hr (Order-Specific), Intravenous, Titrated      PRN Meds:  acetaminophen, 650 mg, Oral, Q6H PRN  heparin (porcine), 2,000 Units, Intravenous, Q1H PRN  heparin (porcine), 4,000 Units, Intravenous, Q1H PRN  ondansetron, 4 mg, Intravenous, Q6H PRN    IP CONSULT TO CARDIOLOGY  Network Utilization Review Department  ATTENTION: Please call with any questions or concerns to [phone] and carefully listen to the prompts so that you are directed to the right person  All voicemails are confidential   Prabhjot Adam all requests for admission clinical reviews, approved or denied determinations and any other requests to dedicated fax number below belonging to the campus where the patient is receiving treatment   List of dedicated fax numbers for the Facilities:  [address] DENIALS (Administrative/Medical Necessity) [phone]   [address] (Maternity/NICU/Pediatrics) [phone] [address] sOmin Dillonel Rula [id number] [id number] [place] [address] [phone] [phone]

## 2021-08-25 NOTE — PLAN OF CARE
Problem: Potential for Falls  Goal: Patient will remain free of falls  Description: INTERVENTIONS:  - Educate patient/family on patient safety including physical limitations  - Instruct patient to call for assistance with activity   - Consult OT/PT to assist with strengthening/mobility   - Keep Call bell within reach  - Keep bed low and locked with side rails adjusted as appropriate  - Keep care items and personal belongings within reach  - Initiate and maintain comfort rounds  - Make Fall Risk Sign visible to staff  - Offer Toileting every    Hours, in advance of need  - Initiate/Maintain   alarm  - Obtain necessary fall risk management equipment:     - Apply yellow socks and bracelet for high fall risk patients  - Consider moving patient to room near nurses station  Outcome: Progressing     Problem: PAIN - ADULT  Goal: Verbalizes/displays adequate comfort level or baseline comfort level  Description: Interventions:  - Encourage patient to monitor pain and request assistance  - Assess pain using appropriate pain scale  - Administer analgesics based on type and severity of pain and evaluate response  - Implement non-pharmacological measures as appropriate and evaluate response  - Consider cultural and social influences on pain and pain management  - Notify physician/advanced practitioner if interventions unsuccessful or patient reports new pain  Outcome: Progressing     Problem: INFECTION - ADULT  Goal: Absence or prevention of progression during hospitalization  Description: INTERVENTIONS:  - Assess and monitor for signs and symptoms of infection  - Monitor lab/diagnostic results  - Monitor all insertion sites, i e  indwelling lines, tubes, and drains  - Monitor endotracheal if appropriate and nasal secretions for changes in amount and color  - [place] appropriate cooling/warming therapies per order  - Administer medications as ordered  - Instruct and encourage patient and family to use good hand hygiene technique  - Identify and instruct in appropriate isolation precautions for identified infection/condition  Outcome: Progressing  Goal: Absence of fever/infection during neutropenic period  Description: INTERVENTIONS:  - Monitor WBC    Outcome: Progressing     Problem: SAFETY ADULT  Goal: Patient will remain free of falls  Description: INTERVENTIONS:  - Educate patient/family on patient safety including physical limitations  - Instruct patient to call for assistance with activity   - Consult OT/PT to assist with strengthening/mobility   - Keep Call bell within reach  - Keep bed low and locked with side rails adjusted as appropriate  - Keep care items and personal belongings within reach  - Initiate and maintain comfort rounds  - Make Fall Risk Sign visible to staff  - Offer Toileting every    Hours, in advance of need  - Initiate/Maintain   alarm  - Obtain necessary fall risk management equipment:     - Apply yellow socks and bracelet for high fall risk patients  - Consider moving patient to room near nurses station  Outcome: Progressing  Goal: Maintain or return to baseline ADL function  Description: INTERVENTIONS:  -  Assess patient's ability to carry out ADLs; assess patient's baseline for ADL function and identify physical deficits which impact ability to perform ADLs (bathing, care of mouth/teeth, toileting, grooming, dressing, etc )  - Assess/evaluate cause of self-care deficits   - Assess range of motion  - Assess patient's mobility; develop plan if impaired  - Assess patient's need for assistive devices and provide as appropriate  - Encourage maximum independence but intervene and supervise when necessary  - Involve family in performance of ADLs  - Assess for home care needs following discharge   - Consider OT consult to assist with ADL evaluation and planning for discharge  - Provide patient education as appropriate  Outcome: Progressing  Goal: Maintains/Returns to pre admission functional level  Description: INTERVENTIONS:  - Perform BMAT or MOVE assessment daily    - Set and communicate daily mobility goal to care team and patient/family/caregiver  - Collaborate with rehabilitation services on mobility goals if consulted  - Perform Range of Motion    times a day  - Reposition patient every    hours  - Dangle patient    times a day  - Stand patient    times a day  - Ambulate patient    times a day  - Out of bed to chair    times a day   - Out of bed for meals    times a day  - Out of bed for toileting  - Record patient progress and toleration of activity level   Outcome: Progressing     Problem: DISCHARGE PLANNING  Goal: Discharge to home or other facility with appropriate resources  Description: INTERVENTIONS:  - Identify barriers to discharge w/patient and caregiver  - Arrange for needed discharge resources and transportation as appropriate  - Identify discharge learning needs (meds, wound care, etc )  - Arrange for interpretive services to assist at discharge as needed  - Refer to Case Management Department for coordinating discharge planning if the patient needs post-hospital services based on physician/advanced practitioner order or complex needs related to functional status, cognitive ability, or social support system  Outcome: Progressing     Problem: Knowledge Deficit  Goal: Patient/family/caregiver demonstrates understanding of disease process, treatment plan, medications, and discharge instructions  Description: Complete learning assessment and assess knowledge base    Interventions:  - Provide teaching at level of understanding  - Provide teaching via preferred learning methods  Outcome: Progressing

## 2021-08-25 NOTE — ASSESSMENT & PLAN NOTE
Lab Results   Component Value Date    HGBA1C 8 3 (H) 08/25/2021       Recent Labs     08/24/21  0913 08/24/21  1640 08/25/21  0801 08/25/21  1105   POCGLU 216* 240* 218* 352*       Blood Sugar Average: Last 72 hrs:  (P) 256 5   A1c last year 6 9  Now significantly higher at 8 3  Also with element of steroid induced hyperglycemia currently due to patient having received Medrol prep for CT scan  Recently initiated Ozempic in conjunction with metformin--I recommended that she follow up closely with her PCP    Work on weight loss

## 2021-08-25 NOTE — PLAN OF CARE
Problem: Potential for Falls  Goal: Patient will remain free of falls  Description: INTERVENTIONS:  - Educate patient/family on patient safety including physical limitations  - Instruct patient to call for assistance with activity   - Consult OT/PT to assist with strengthening/mobility   - Keep Call bell within reach  - Keep bed low and locked with side rails adjusted as appropriate  - Keep care items and personal belongings within reach  - Initiate and maintain comfort rounds  - Make Fall Risk Sign visible to staff  - Offer Toileting every 2 Hours, in advance of need  - Initiate/Maintain call bell alarm  - Obtain necessary fall risk management equipment: call bell alarm  - Apply yellow socks and bracelet for high fall risk patients  - Consider moving patient to room near nurses station  Outcome: Progressing     Problem: PAIN - ADULT  Goal: Verbalizes/displays adequate comfort level or baseline comfort level  Description: Interventions:  - Encourage patient to monitor pain and request assistance  - Assess pain using appropriate pain scale  - Administer analgesics based on type and severity of pain and evaluate response  - Implement non-pharmacological measures as appropriate and evaluate response  - Consider cultural and social influences on pain and pain management  - Notify physician/advanced practitioner if interventions unsuccessful or patient reports new pain  Outcome: Progressing     Problem: INFECTION - ADULT  Goal: Absence or prevention of progression during hospitalization  Description: INTERVENTIONS:  - Assess and monitor for signs and symptoms of infection  - Monitor lab/diagnostic results  - Monitor all insertion sites, i e  indwelling lines, tubes, and drains  - Monitor endotracheal if appropriate and nasal secretions for changes in amount and color  - [place] appropriate cooling/warming therapies per order  - Administer medications as ordered  - Instruct and encourage patient and family to use good hand hygiene technique  - Identify and instruct in appropriate isolation precautions for identified infection/condition  Outcome: Progressing  Goal: Absence of fever/infection during neutropenic period  Description: INTERVENTIONS:  - Monitor WBC    Outcome: Progressing     Problem: SAFETY ADULT  Goal: Patient will remain free of falls  Description: INTERVENTIONS:  - Educate patient/family on patient safety including physical limitations  - Instruct patient to call for assistance with activity   - Consult OT/PT to assist with strengthening/mobility   - Keep Call bell within reach  - Keep bed low and locked with side rails adjusted as appropriate  - Keep care items and personal belongings within reach  - Initiate and maintain comfort rounds  - Make Fall Risk Sign visible to staff  - Offer Toileting every 2 Hours, in advance of need  - Initiate/Maintain 2alarm  - Obtain necessary fall risk management equipment: 2  - Apply yellow socks and bracelet for high fall risk patients  - Consider moving patient to room near nurses station  Outcome: Progressing  Goal: Maintain or return to baseline ADL function  Description: INTERVENTIONS:  -  Assess patient's ability to carry out ADLs; assess patient's baseline for ADL function and identify physical deficits which impact ability to perform ADLs (bathing, care of mouth/teeth, toileting, grooming, dressing, etc )  - Assess/evaluate cause of self-care deficits   - Assess range of motion  - Assess patient's mobility; develop plan if impaired  - Assess patient's need for assistive devices and provide as appropriate  - Encourage maximum independence but intervene and supervise when necessary  - Involve family in performance of ADLs  - Assess for home care needs following discharge   - Consider OT consult to assist with ADL evaluation and planning for discharge  - Provide patient education as appropriate  Outcome: Progressing  Goal: Maintains/Returns to pre admission functional level  Description: INTERVENTIONS:  - Perform BMAT or MOVE assessment daily    - Set and communicate daily mobility goal to care team and patient/family/caregiver  - Collaborate with rehabilitation services on mobility goals if consulted  - Perform Range of Motion 2 times a day  - Reposition patient every 2 hours  - Dangle patient 2 times a day  - Stand patient 2 times a day  - Ambulate patient 2 times a day  - Out of bed to chair 2 times a day   - Out of bed for meals 2 times a day  - Out of bed for toileting  - Record patient progress and toleration of activity level   Outcome: Progressing     Problem: DISCHARGE PLANNING  Goal: Discharge to home or other facility with appropriate resources  Description: INTERVENTIONS:  - Identify barriers to discharge w/patient and caregiver  - Arrange for needed discharge resources and transportation as appropriate  - Identify discharge learning needs (meds, wound care, etc )  - Arrange for interpretive services to assist at discharge as needed  - Refer to Case Management Department for coordinating discharge planning if the patient needs post-hospital services based on physician/advanced practitioner order or complex needs related to functional status, cognitive ability, or social support system  Outcome: Progressing     Problem: Knowledge Deficit  Goal: Patient/family/caregiver demonstrates understanding of disease process, treatment plan, medications, and discharge instructions  Description: Complete learning assessment and assess knowledge base    Interventions:  - Provide teaching at level of understanding  - Provide teaching via preferred learning methods  Outcome: Progressing

## 2021-08-25 NOTE — INCIDENTAL FINDINGS
The following findings require follow up:  Radiographic finding   Finding: "Right basilar paramedian infiltrate is noted  Patient is post gastric bypass  There appears to be a superiorly projecting diverticulum from the excluded portion of the stomach   Consider dedicated upper GI in the appropriate clinical setting if indicated clinically "   Follow up required: talk to your family doctor as soon as possible

## 2021-08-25 NOTE — ASSESSMENT & PLAN NOTE
Near syncopal episodes associated with palpitations  Moderate to severe AS noted  Outpatient Zio patch was recommended but should be arranged in her home state of [address]

## 2021-08-25 NOTE — DISCHARGE INSTR - AVS FIRST PAGE
Dear Homero Bains,     It was our pleasure to care for you here at [facility] [place]  It is our hope that we were always able to exceed the expected standards for your care during your stay  You were hospitalized due to chest pain with elevated heart enzymes, newly diagnosed moderate to severe aortic stenosis, and uncontrolled diabetes  In addition you tested positive for COVID-19  You were cared for on the 3rd floor by Regina Boland PA-C under the service of Mac Panchal MD with the [address] [place] [address] [facility] who covers for your primary care physician (PCP), No primary care provider on file  , while you were hospitalized  If you have any questions or concerns related to this hospitalization, you may contact us at [phone]  For follow up as well as any medication refills, we recommend that you follow up with your primary care physician  A registered nurse will reach out to you by phone within a few days after your discharge to answer any additional questions that you may have after going home  However, at this time we provide for you here, the most important instructions / recommendations at discharge:     · Notable Medication Adjustments -   · Our team made phone calls to help arrange an outpatient monoclonal antibody infusion for you based on your high risk of complications from [place]  You will receive a call explaining how to proceed with a virtual visit and arrangements for monoclonal antibody infusion  · Testing Required after Discharge -   · Two week heart monitor to be arranged by Cardiology upon return to [facility]  · Your CT scan revealed an incidental abnormality that should be followed up by your family doctor   · Important follow up information -   · Please call your family doctor in [facility] as soon as possible to help get established with a cardiologist so that you can have a 2 week cardiac monitor arranged    Also discussed with your family doctor your uncontrolled blood sugars and further options to achieve control  · Other Instructions -   · Monitor blood sugars at home  Monitor for symptoms of chest pain, shortness of breath and lightheadedness which can be caused by your aortic stenosis  Never drive if you are feeling lightheaded  · Please review this entire after visit summary as additional general instructions including medication list, appointments, activity, diet, any pertinent wound care, and other additional recommendations from your care team that may be provided for you  [address]    Your healthcare provider and/or public health staff have evaluated you and have determined that you do not need to remain in the hospital at this time  At this time you can be isolated at home where you will be monitored by staff from your local or state health department  You should carefully follow the prevention and isolation steps below until a healthcare provider or local or state health department says that you can return to your normal activities  Stay home except to get medical care    People who are mildly ill with COVID-19 are able to isolate at home during their illness  You should restrict activities outside your home, except for getting medical care  Do not go to work, school, or public areas  Avoid using public transportation, ride-sharing, or taxis  Separate yourself from other people and animals in your home    People: As much as possible, you should stay in a specific room and away from other people in your home  Also, you should use a separate bathroom, if available  Animals: You should restrict contact with pets and other animals while you are sick with COVID-19, just like you would around other people   Although there have not been reports of pets or other animals becoming sick with COVID-19, it is still recommended that people sick with COVID-19 limit contact with animals until more information is known about the virus  When possible, have another member of your household care for your animals while you are sick  If you are sick with COVID-19, avoid contact with your pet, including petting, snuggling, being kissed or licked, and sharing food  If you must care for your pet or be around animals while you are sick, wash your hands before and after you interact with pets and wear a facemask  See COVID-19 and Animals for more information  Call ahead before visiting your doctor    If you have a medical appointment, call the healthcare provider and tell them that you have or may have COVID-19  This will help the healthcare providers office take steps to keep other people from getting infected or exposed  Wear a facemask    You should wear a facemask when you are around other people (e g , sharing a room or vehicle) or pets and before you enter a healthcare providers office  If you are not able to wear a facemask (for example, because it causes trouble breathing), then people who live with you should not stay in the same room with you, or they should wear a facemask if they enter your room  Cover your coughs and sneezes    Cover your mouth and nose with a tissue when you cough or sneeze  Throw used tissues in a lined trash can  Immediately wash your hands with soap and water for at least 20 seconds or, if soap and water are not available, clean your hands with an alcohol-based hand  that contains at least 60% alcohol  Clean your hands often    Wash your hands often with soap and water for at least 20 seconds, especially after blowing your nose, coughing, or sneezing; going to the bathroom; and before eating or preparing food  If soap and water are not readily available, use an alcohol-based hand  with at least 60% alcohol, covering all surfaces of your hands and rubbing them together until they feel dry  Soap and water are the best option if hands are visibly dirty   Avoid touching your eyes, nose, and mouth with unwashed hands  Avoid sharing personal household items    You should not share dishes, drinking glasses, cups, eating utensils, towels, or bedding with other people or pets in your home  After using these items, they should be washed thoroughly with soap and water  Clean all high-touch surfaces everyday    High touch surfaces include counters, tabletops, doorknobs, bathroom fixtures, toilets, phones, keyboards, tablets, and bedside tables  Also, clean any surfaces that may have blood, stool, or body fluids on them  Use a household cleaning spray or wipe, according to the label instructions  Labels contain instructions for safe and effective use of the cleaning product including precautions you should take when applying the product, such as wearing gloves and making sure you have good ventilation during use of the product  Monitor your symptoms    Seek prompt medical attention if your illness is worsening (e g , difficulty breathing)  Before seeking care, call your healthcare provider and tell them that you have, or are being evaluated for, COVID-19  Put on a facemask before you enter the facility  These steps will help the healthcare providers office to keep other people in the office or waiting room from getting infected or exposed  Ask your healthcare provider to call the local or [facility] health department  Persons who are placed under active monitoring or facilitated self-monitoring should follow instructions provided by their local health department or occupational health professionals, as appropriate  If you have a medical emergency and need to call 911, notify the dispatch personnel that you have, or are being evaluated for COVID-19  If possible, put on a facemask before emergency medical services arrive      Discontinuing home isolation    Patients with confirmed COVID-19 should remain under home isolation precautions until the following conditions are met:   - They have had no fever for at least 24 hours (that is one full day of no fever without the use medicine that reduces fevers)  AND  - other symptoms have improved (for example, when their cough or shortness of breath have improved)  AND  - If had mild or moderate illness, at least 10 days have passed since their symptoms first appeared or if severe illness (needed oxygen) or immunosuppressed, at least 20 days have passed since symptoms first appeared  Patients with confirmed COVID-19 should also notify close contacts (including their workplace) and ask that they self-quarantine  Currently, close contact is defined as being within 6 feet for 15 minutes or more from the period 24 hours starting 48 hours before symptom onset to the time at which the patient went into isolation  Close contacts of patients diagnosed with COVID-19 should be instructed by the patient to self-quarantine for 14 days from the last time of their last contact with the patient       Source: [employer]    Sincerely,     [place] PA-C

## 2021-08-25 NOTE — ASSESSMENT & PLAN NOTE
· Intermittent chest pain,  palpitations and near syncopal episode x 4  Also reports dry cough but no fever  · Troponin elevation noted--likely not an MI troponin elevation due to aortic stenosis as seen on echocardiogram   Case discussed with Cardiology    Patient will require outpatient cardiology follow-up  · Nuclear Myoview stress test noted to be normal, therefore no current indication to proceed with catheterization  · CTA of the chest negative for PE but did show infiltrate in the right lung, but patient without any symptomatology of pneumonia  · Requested COVID test in the setting that patient is unvaccinated and at high risk of adverse consequences if she gets COVID

## 2021-08-25 NOTE — PROGRESS NOTES
COVID-19 Outpatient Progress Note    Assessment/Plan:    Problem List Items Addressed This Visit        Endocrine    Type 2 diabetes mellitus with mild nonproliferative diabetic retinopathy without macular edema (HCC) - Primary       Cardiovascular and Mediastinum    Aortic stenosis, moderate to severe       Other    Class 3 severe obesity in [facility])    COVID-19         Disposition:     Patient is at increased risk of progressing towards severe COVID-19 due to the following high risk criteria:   - Obesity or being overweight  - Diabetes    Patient meets criteria for Casirivimab/Imdevimab administration for the treatment of COVID-19  They were counseled in regards to risks, benefits, and side effects of this infusion  Casirivimab and imdevimab are investigational medicines used to treat mild to moderate symptoms of COVID-19 in non-hospitalized adults and adolescents (15years of age and older who weigh at least 80 pounds (40 kg)), and who are at high risk for developing severe COVID-19 symptoms or the need for hospitalization  Casirivimab and imdevimab are investigational because they are still being studied  There is limited information known about the safety and effectiveness of using casirivimab and imdevimab to treat people with COVID-19  The FDA has authorized the emergency use of casirivimab and imdevimab for the treatment of COVID-19 under an Emergency Use Authorization (EUA)  Possible side effects of casirivimab and imdevimab: Allergic reactions can happen during and after infusion with casirivimab and imdevimab  Possible reactions include: fever, chills, nausea, headache, shortness of breath, low blood pressure, wheezing, swelling of your lips, face, or throat, rash including hives, itching, muscle aches, and dizziness      The side effects of getting any medicine by vein may include brief pain, bleeding, bruising of the skin, soreness, swelling, and possible infection at the infusion site     These are not all the possible side effects of casirivimab and imdevimab  Not a lot of people have been given casirivimab and imdevimab  Serious and unexpected side effects may happen  Casirivimab and imdevimab are still being studied so it is possible that all of the risks are not known at this time  It is possible that casirivimab and imdevimab could interfere with your body's own ability to fight off a future infection of SARS-CoV-2  Similarly, casirivimab and imdevimab may reduce your body's immune response to a vaccine for SARS-CoV-2  Specific studies have not been conducted to address these possible risks  Emergency Use Authorization:    The [employer] FDA has made casirivimab and imdevimab available under an emergency access mechanism called an EUA  The EUA is supported by a [place] of Health and Human Service ([facility]) declaration that circumstances exist to justify the emergency use of drugs and biological products during the COVID-19 pandemic  Casirivimab and imdevimab have not undergone the same type of review as an FDA-approved or cleared product  The FDA may issue an EUA when certain criteria are met, which includes that there are no adequate, approved, available alternatives  In addition, the FDA decision is based on the totality of scientific evidence available showing that it is reasonable to believe that the product meets certain criteria for safety, performance, and labeling and may be effective in treatment of patients during the COVID-19 pandemic  All of these criteria must be met to allow for the product to be used in the treatment of patients during the COVID-19 pandemic  The EUA for casirivimab and imdevimab is in effect for the duration of the COVID-19 declaration justifying emergency use of these products, unless terminated or revoked (after which the products may no longer be used)       Regarding COVID-19 Vaccination:    Currently there is no data or safety or efficacy of COVID-19 vaccination in persons who received monoclonal antibodies as part of COVID-19 treatment  Treatment should be deferred for at least 90 days to avoid interference of the treatment with vaccine-induced immune responses (this is based on estimated half-life of therapies and evidence suggesting reinfection is uncommon within 90 days of initial infection)  The patient consents to proceed with casirivimab and imdevimab administration  I have spent 20 minutes directly with the patient  Greater than 50% of this time was spent in counseling/coordination of care regarding: prognosis, risks and benefits of treatment options, instructions for management, importance of treatment compliance, risk factor reductions and impressions  Verification of patient location:    Patient is located in the following state in which I hold an active license [place]    Encounter provider Jamie Gomez MD    Provider located at [address] [place] [address] [address] [facility]  [phone]    Recent Visits  No visits were found meeting these conditions  Showing recent visits within past 7 days and meeting all other requirements  Today's Visits  Date Type Provider Dept   08/25/21 Telemedicine Jamie Gomez MD  Internal [facility]   Showing today's visits and meeting all other requirements  Future Appointments  No visits were found meeting these conditions  Showing future appointments within next 150 days and meeting all other requirements     This virtual check-in was done via [employer] and patient was informed that this is a secure, HIPAA-compliant platform  She agrees to proceed  Patient agrees to participate in a virtual check in via telephone or video visit instead of presenting to the office to address urgent/immediate medical needs  Patient is aware this is a billable service  After connecting through [facility], the patient was identified by name and date of birth  Meño Sorto was informed that this was a telemedicine visit and that the exam was being conducted confidentially over secure lines  My office door was closed  No one else was in the room  Meño Sorto acknowledged consent and understanding of privacy and security of the telemedicine visit  I informed the patient that I have reviewed her record in Epic and presented the opportunity for her to ask any questions regarding the visit today  The patient agreed to participate  Subjective:   Meño Sorto is a 64 y o  female who is concerned about COVID-19  Patient's symptoms include fatigue, malaise, cough, shortness of breath and myalgias  Patient denies fever, chills, congestion, sore throat, chest tightness, abdominal pain, nausea, diarrhea and headaches  Date of symptom onset: 8/23/2021  COVID-19 vaccination status: Not vaccinated    Exposure:   Contact with a person who is under investigation (PUI) for or who is positive for COVID-19 within the last 14 days?: No    Hospitalized recently for fever and/or lower respiratory symptoms?: No      Currently a healthcare worker that is involved in direct patient care?: No      Works in a special setting where the risk of COVID-19 transmission may be high? (this may include long-term care, correctional and senior living facilities; homeless shelters; assisted-living facilities and group homes ): No      Resident in a special setting where the risk of COVID-19 transmission may be high? (this may include long-term care, correctional and senior living facilities; homeless shelters; assisted-living facilities and group homes ): No      Lab Results   Component Value Date    SARSCOV2 Positive (A) 08/25/2021     Past Medical History:   Diagnosis Date    Carotid artery calcification, left     CEA 2013    Diabetes mellitus ([facility] [address] )     Heart murmur      No past surgical history on file  No current facility-administered medications for this visit       No current outpatient medications on file       Facility-Administered Medications Ordered in Other Visits   Medication Dose Route Frequency Provider Last Rate Last Admin    acetaminophen (TYLENOL) tablet 650 mg  650 mg Oral Q6H PRN Jay Almonte MD   650 mg at 08/25/21 1155    aspirin chewable tablet 324 mg  324 mg Oral Daily Jay Almonte MD   324 mg at 08/25/21 1107    ezetimibe (ZETIA) tablet 10 mg  10 mg Oral HS Jay Almonte MD   10 mg at 08/24/21 2116    ferrous sulfate tablet 325 mg  325 mg Oral Daily With Breakfast Jay Almonte MD   325 mg at 08/25/21 0820    fluticasone (FLONASE) 50 mcg/act nasal spray 2 spray  2 spray Nasal Daily Jay Almonte MD   2 spray at 08/25/21 1112    heparin (porcine) 25,000 units in 0 45% NaCl 250 mL infusion (premix)  3-20 Units/kg/hr (Order-Specific) Intravenous Titrated Jay Almonte MD 10 mL/hr at 08/25/21 1259 11 1 Units/kg/hr at 08/25/21 1259    heparin (porcine) injection 2,000 Units  2,000 Units Intravenous Q1H PRN Jay Almonte MD   2,000 Units at 08/25/21 0442    heparin (porcine) injection 4,000 Units  4,000 Units Intravenous Q1H PRN Jay Almonte MD        insulin lispro (HumaLOG) 100 units/mL subcutaneous injection 1-6 Units  1-6 Units Subcutaneous TID [facility] Jay Almonte MD   6 Units at 08/25/21 1107    loratadine (CLARITIN) tablet 10 mg  10 mg Oral Daily Jay Almonte MD   10 mg at 08/25/21 0820    magnesium oxide (MAG-OX) tablet 400 mg  400 mg Oral Daily Jay Almonte MD   400 mg at 08/25/21 0820    ondansetron (ZOFRAN) injection 4 mg  4 mg Intravenous Q6H PRN Jay Almonte MD        pantoprazole (PROTONIX) EC tablet 40 mg  40 mg Oral Early Morning Jay Almonte MD   40 mg at 08/25/21 0531    potassium citrate (UROCIT-K) CR tablet 20 mEq  20 mEq Oral BID Jay Almonte MD   20 mEq at 08/25/21 1108     Allergies   Allergen Reactions    Molds & Smuts Other (See Comments) and Shortness Of Breath    Dye [Iodinated Diagnostic Agents]     Other Myalgia and Other (See Comments) Other reaction(s): OTHER  Leg cramps  Leg cramps  Leg cramps      Statins        Review of Systems   Constitutional: Positive for fatigue  Negative for chills, fever and unexpected weight change  HENT: Negative for congestion, ear pain, hearing loss, postnasal drip, sinus pressure, sore throat, trouble swallowing and voice change  Eyes: Negative for visual disturbance  Respiratory: Positive for cough and shortness of breath  Negative for chest tightness and wheezing  Cardiovascular: Positive for chest pain  Negative for palpitations and leg swelling  Gastrointestinal: Negative for abdominal distention, abdominal pain, anal bleeding, blood in stool, constipation, diarrhea and nausea  Endocrine: Negative for cold intolerance, polydipsia, polyphagia and polyuria  Genitourinary: Negative for dysuria, flank pain, frequency, hematuria and urgency  Musculoskeletal: Positive for myalgias  Negative for arthralgias, back pain, gait problem, joint swelling and neck pain  Skin: Negative for rash  Allergic/Immunologic: Negative for immunocompromised state  Neurological: Positive for dizziness, syncope and weakness  Negative for facial asymmetry, light-headedness, numbness and headaches  Hematological: Negative for adenopathy  Psychiatric/Behavioral: Negative for confusion, sleep disturbance and suicidal ideas  Objective: There were no vitals filed for this visit  Physical Exam  Vitals and nursing note reviewed  Constitutional:       General: She is not in acute distress  Appearance: She is well-developed  She is obese  She is not toxic-appearing  HENT:      Head: Normocephalic and atraumatic  Eyes:      Conjunctiva/sclera: Conjunctivae normal    Cardiovascular:      Rate and Rhythm: Normal rate and regular rhythm  Heart sounds: No murmur heard  Pulmonary:      Effort: Pulmonary effort is normal  No respiratory distress  Breath sounds: Normal breath sounds  Abdominal:      Palpations: Abdomen is soft  Tenderness: There is no abdominal tenderness  Musculoskeletal:         General: Normal range of motion  Cervical back: Neck supple  Skin:     General: Skin is warm and dry  Neurological:      General: No focal deficit present  Mental Status: She is alert and oriented to person, place, and time  VIRTUAL VISIT DISCLAIMER    Junior Canas verbally agrees to participate in [employer]  Pt is aware that [employer] could be limited without vital signs or the ability to perform a full hands-on physical Hernandez Call understands she or the provider may request at any time to terminate the video visit and request the patient to seek care or treatment in person

## 2021-08-26 ENCOUNTER — HOSPITAL ENCOUNTER (OUTPATIENT)
Dept: INFUSION CENTER | Facility: HOSPITAL | Age: 56
Discharge: HOME | End: 2021-08-26
Payer: COMMERCIAL

## 2021-08-26 VITALS
DIASTOLIC BLOOD PRESSURE: 66 MMHG | HEART RATE: 95 BPM | RESPIRATION RATE: 18 BRPM | SYSTOLIC BLOOD PRESSURE: 131 MMHG | OXYGEN SATURATION: 96 % | TEMPERATURE: 100.2 F

## 2021-08-26 DIAGNOSIS — M17.11 PRIMARY OSTEOARTHRITIS OF RIGHT KNEE: ICD-10-CM

## 2021-08-26 DIAGNOSIS — Z79.4 TYPE 2 DIABETES MELLITUS WITH BOTH EYES AFFECTED BY MILD NONPROLIFERATIVE RETINOPATHY WITHOUT MACULAR EDEMA, WITH LONG-TERM CURRENT USE OF INSULIN (HCC): ICD-10-CM

## 2021-08-26 DIAGNOSIS — I35.0 AORTIC VALVE STENOSIS, ETIOLOGY OF CARDIAC VALVE DISEASE UNSPECIFIED: ICD-10-CM

## 2021-08-26 DIAGNOSIS — U07.1 COVID-19: ICD-10-CM

## 2021-08-26 DIAGNOSIS — E11.3293 TYPE 2 DIABETES MELLITUS WITH BOTH EYES AFFECTED BY MILD NONPROLIFERATIVE RETINOPATHY WITHOUT MACULAR EDEMA, WITH LONG-TERM CURRENT USE OF INSULIN (HCC): ICD-10-CM

## 2021-08-26 DIAGNOSIS — N20.0 RIGHT KIDNEY STONE: Primary | ICD-10-CM

## 2021-08-26 PROCEDURE — M0243 CASIRIVI AND IMDEVI INFUSION: HCPCS | Performed by: INTERNAL MEDICINE

## 2021-08-26 RX ORDER — SODIUM CHLORIDE 9 MG/ML
20 INJECTION, SOLUTION INTRAVENOUS ONCE
Status: COMPLETED | OUTPATIENT
Start: 2021-08-26 | End: 2021-08-26

## 2021-08-26 RX ORDER — SODIUM CHLORIDE 9 MG/ML
20 INJECTION, SOLUTION INTRAVENOUS ONCE
Status: CANCELLED | OUTPATIENT
Start: 2021-08-26

## 2021-08-26 RX ORDER — ALBUTEROL SULFATE 90 UG/1
3 AEROSOL, METERED RESPIRATORY (INHALATION) ONCE AS NEEDED
Status: DISCONTINUED | OUTPATIENT
Start: 2021-08-26 | End: 2021-08-29 | Stop reason: HOSPADM

## 2021-08-26 RX ORDER — ACETAMINOPHEN 325 MG/1
650 TABLET ORAL ONCE AS NEEDED
Status: CANCELLED | OUTPATIENT
Start: 2021-08-26

## 2021-08-26 RX ORDER — ACETAMINOPHEN 325 MG/1
650 TABLET ORAL ONCE AS NEEDED
Status: DISCONTINUED | OUTPATIENT
Start: 2021-08-26 | End: 2021-08-29 | Stop reason: HOSPADM

## 2021-08-26 RX ORDER — ALBUTEROL SULFATE 90 UG/1
3 AEROSOL, METERED RESPIRATORY (INHALATION) ONCE AS NEEDED
Status: CANCELLED | OUTPATIENT
Start: 2021-08-26

## 2021-08-26 RX ORDER — ONDANSETRON 2 MG/ML
4 INJECTION INTRAMUSCULAR; INTRAVENOUS ONCE AS NEEDED
Status: DISCONTINUED | OUTPATIENT
Start: 2021-08-26 | End: 2021-08-29 | Stop reason: HOSPADM

## 2021-08-26 RX ORDER — ONDANSETRON 2 MG/ML
4 INJECTION INTRAMUSCULAR; INTRAVENOUS ONCE AS NEEDED
Status: CANCELLED | OUTPATIENT
Start: 2021-08-26

## 2021-08-26 RX ADMIN — IMDEVIMAB: 300 INJECTION, SOLUTION, CONCENTRATE INTRAVENOUS at 13:28

## 2021-08-26 RX ADMIN — SODIUM CHLORIDE 20 ML/HR: 0.9 INJECTION, SOLUTION INTRAVENOUS at 13:09

## 2021-08-26 RX ADMIN — ACETAMINOPHEN 650 MG: 325 TABLET, FILM COATED ORAL at 13:12

## 2021-08-26 NOTE — PROGRESS NOTES
Pt stable at time of discharge  Vital signs with in normal limits  Aware that they will have a follow up with their physician with in 24 hours  Copy of discharge instructions given to pt

## 2021-08-26 NOTE — PROGRESS NOTES
Patient tolerated infusion without issue  Vital signs stable  Will continue to observe patient for one hour

## 2021-08-26 NOTE — PROGRESS NOTES
Patient here for regeneron today  Vital signs stable, pt has temp 100 6 tylenol 650mg given  Copy of EUA given to patient and patient verbally agreed to get the medication today  Will continue to monitor

## 2021-08-30 ENCOUNTER — TELEMEDICINE (OUTPATIENT)
Dept: INTERNAL MEDICINE CLINIC | Age: 56
End: 2021-08-30
Payer: COMMERCIAL

## 2021-08-30 DIAGNOSIS — U07.1 COVID-19: Primary | ICD-10-CM

## 2021-08-30 PROCEDURE — 99213 OFFICE O/P EST LOW 20 MIN: CPT | Performed by: INTERNAL MEDICINE

## 2021-08-30 RX ORDER — PROMETHAZINE HYDROCHLORIDE AND CODEINE PHOSPHATE 6.25; 1 MG/5ML; MG/5ML
5 SYRUP ORAL EVERY 4 HOURS PRN
Qty: 120 ML | Refills: 0 | Status: SHIPPED | OUTPATIENT
Start: 2021-08-30

## 2021-08-30 NOTE — PROGRESS NOTES
Virtual Brief Visit    Verification of patient location:    Patient is located in the following state in which I hold an active license [place]      Assessment/Plan:    Problem List Items Addressed This Visit        Other    COVID-19 - Primary    Relevant Medications    promethazine-codeine (PHENERGAN WITH CODEINE) 6 25-10 mg/5 mL syrup                Reason for visit is   Chief Complaint   Patient presents with    Virtual Brief Visit        Encounter provider Fabricio Bone MD    Provider located at [address] [place] [address] [address] [facility]  [phone]    Recent Visits  Date Type Provider Dept   08/25/21 Telemedicine Fabricio Bone MD [facility]   Showing recent visits within past 7 days and meeting all other requirements  Today's Visits  Date Type Provider Dept   08/30/21 Telemedicine Fabricio Bone MD [facility]   Showing today's visits and meeting all other requirements  Future Appointments  No visits were found meeting these conditions  Showing future appointments within next 150 days and meeting all other requirements       After connecting through telephone, the patient was identified by name and date of birth  Nanda Asher was informed that this is a telemedicine visit and that the visit is being conducted through [employer] and patient was informed that this is a secure, HIPAA-compliant platform  She agrees to proceed  My office door was closed  No one else was in the room  She acknowledged consent and understanding of privacy and security of the platform  The patient has agreed to participate and understands she can discontinue the visit at any time  Patient is aware this is a billable service  Subjective    Nanda Asher is a 64 y o  female  DX last week with COVID-19 and set up for monoclonal antibody infusion on Friday      As mentioned in her chief complaint she was diagnosed with COVID while traveling to an out of state concert  Following a brief hospitalization for shortness of breath to rule out cardiac issues she was discharged and set up with monoclonal antibodies  She has since from bed to [facility]  She still complains of his significant cough and ongoing diarrhea  She has not been taking any medication for either     Patient still complains of a severe cough and diarrhea but no fever or other symptoms  She is back home  In [facility]  She is keeping fluids in but is not taking anything for the diarrhea  Past Medical History:   Diagnosis Date    Carotid artery calcification, left     CEA 2013    Diabetes mellitus ([address] )     Heart murmur        No past surgical history on file      Current Outpatient Medications   Medication Sig Dispense Refill    Biotin 10 MG TABS Take 10 mg by mouth      calcium citrate-Vitamin D 200 mg-250 units Take by mouth      cyanocobalamin (VITAMIN B-12) 1000 MCG tablet Take by mouth      Cyanocobalamin ER 1500 MCG TBCR Take by mouth      ezetimibe (ZETIA) 10 mg tablet Take 10 mg by mouth      ferrous sulfate 325 (65 Fe) mg tablet Take 325 mg by mouth      fluticasone (FLONASE) 50 mcg/act nasal spray 2 sprays into each nostril daily      loratadine (CLARITIN) 10 mg tablet Take 10 mg by mouth as needed      MAGNESIUM PO Take 400 mg by mouth      metFORMIN (GLUCOPHAGE) 850 mg tablet Take 1,000 mg by mouth 2 (two) times a day      Multiple Vitamin (MULTIVITAMIN ADULT PO) Take 1 tablet by mouth      Multiple Vitamins-Minerals (Multi-Day Plus Minerals) TABS Take 1 tablet by mouth      omeprazole (PriLOSEC) 20 mg delayed release capsule Take 20 mg by mouth daily      potassium citrate (UROCIT-K) 10 mEq Take 20 mEq by mouth      Probiotic Product (Misc Intestinal Zonia Regulat) CAPS Take by mouth      promethazine-codeine (PHENERGAN WITH CODEINE) 6 25-10 mg/5 mL syrup Take 5 mL by mouth every 4 (four) hours as needed for cough 120 mL 0    semaglutide, 1 mg/dose, (Ozempic, 1 MG/DOSE,) 4 MG/3ML SOPN injection pen Inject 1 mg under the skin once a week      Zinc 50 MG CAPS Take 50 mg by mouth       No current facility-administered medications for this visit  Allergies   Allergen Reactions    Molds & Smuts Other (See Comments) and Shortness Of Breath    Dye [Iodinated Diagnostic Agents]     Other Myalgia and Other (See Comments)     Other reaction(s): OTHER  Leg cramps  Leg cramps  Leg cramps      Statins        Review of Systems   Constitutional: Positive for fatigue  Negative for chills, fever and unexpected weight change  HENT: Negative for congestion, ear pain, hearing loss, postnasal drip, sinus pressure, sore throat, trouble swallowing and voice change  Eyes: Negative for visual disturbance  Respiratory: Positive for shortness of breath ( mild)  Negative for cough, chest tightness and wheezing  Cardiovascular: Positive for leg swelling ( chronic trace)  Negative for chest pain and palpitations  Gastrointestinal: Negative for abdominal distention, abdominal pain, anal bleeding, blood in stool, constipation, diarrhea and nausea  Endocrine: Negative for cold intolerance, polydipsia, polyphagia and polyuria  Genitourinary: Negative for dysuria, flank pain, frequency, hematuria and urgency  Musculoskeletal: Negative for arthralgias, back pain, gait problem, joint swelling, myalgias and neck pain  Skin: Negative for rash  Allergic/Immunologic: Negative for immunocompromised state  Neurological: Positive for weakness  Negative for dizziness, syncope, facial asymmetry, light-headedness, numbness and headaches  Hematological: Negative for adenopathy  Bruises/bleeds easily  Psychiatric/Behavioral: Negative for confusion, sleep disturbance and suicidal ideas  There were no vitals filed for this visit        I spent 15 minutes directly with the patient during this visit    VIRTUAL VISIT DISCLAIMER      Elzbieta Morrison verbally agrees to participate in [facility]  Pt is aware that [employer] could be limited without vital signs or the ability to perform a full hands-on physical Kesha Martinez understands she or the provider may request at any time to terminate the video visit and request the patient to seek care or treatment in person

## 2021-08-31 NOTE — UTILIZATION REVIEW
Notification of Discharge   This is a Notification of Discharge from our facility [address] [place] [address]  Please be advised that this patient has been discharge from our facility  Below you will find the admission and discharge date and time including the patients disposition  UTILIZATION REVIEW CONTACT:  Kierra Brandt  Utilization   Network Utilization Review Department  Phone: [phone] x carefully listen to the prompts  All voicemails are confidential   Email: [email or web address]     PHYSICIAN ADVISORY SERVICES:  FOR [id number] REVIEW - MEDICAL NECESSITY DENIAL  Phone: [phone]  Fax: [phone]  Email: [email or web address]     PRESENTATION DATE: 8/24/2021  9:06 AM  OBERVATION ADMISSION DATE:   INPATIENT ADMISSION DATE: 8/24/21 11:02 AM   DISCHARGE DATE: 8/25/2021  3:57 PM  DISPOSITION: Home/Self Care Home/Self Care      IMPORTANT INFORMATION:  Send all requests for admission clinical reviews, approved or denied determinations and any other requests to dedicated fax number below belonging to the campus where the patient is receiving treatment   List of dedicated fax numbers:  [address] DENIALS (Administrative/Medical Necessity) [phone]   [address] [place] [address] (Maternity/NICU/Pediatrics) [phone]   Hermelinda Escobedo [phone]   [facility] [phone]   Justin Urena [phone]   Farida Degroot [facility] [address] [facility] [phone]   [facility]  [phone]   [address] [facility]  [address] [facility] [address] [facility] [phone]